# Patient Record
Sex: FEMALE | Race: WHITE
[De-identification: names, ages, dates, MRNs, and addresses within clinical notes are randomized per-mention and may not be internally consistent; named-entity substitution may affect disease eponyms.]

---

## 2021-04-02 ENCOUNTER — HOSPITAL ENCOUNTER (INPATIENT)
Dept: HOSPITAL 74 - JER | Age: 86
LOS: 4 days | Discharge: NURSING, REHAB OR LONG-TERM CARE | DRG: 57 | End: 2021-04-06
Attending: INTERNAL MEDICINE | Admitting: HOSPITALIST
Payer: COMMERCIAL

## 2021-04-02 VITALS — BODY MASS INDEX: 30.6 KG/M2

## 2021-04-02 DIAGNOSIS — E78.5: ICD-10-CM

## 2021-04-02 DIAGNOSIS — I10: ICD-10-CM

## 2021-04-02 DIAGNOSIS — G20: Primary | ICD-10-CM

## 2021-04-02 DIAGNOSIS — E11.42: ICD-10-CM

## 2021-04-02 DIAGNOSIS — N39.0: ICD-10-CM

## 2021-04-02 DIAGNOSIS — E86.0: ICD-10-CM

## 2021-04-02 DIAGNOSIS — F17.210: ICD-10-CM

## 2021-04-02 DIAGNOSIS — E03.9: ICD-10-CM

## 2021-04-02 DIAGNOSIS — R29.6: ICD-10-CM

## 2021-04-02 LAB
ALBUMIN SERPL-MCNC: 3.8 G/DL (ref 3.4–5)
ALP SERPL-CCNC: 110 U/L (ref 45–117)
ALT SERPL-CCNC: 9 U/L (ref 13–61)
ANION GAP SERPL CALC-SCNC: 6 MMOL/L (ref 8–16)
ANION GAP SERPL CALC-SCNC: 6 MMOL/L (ref 8–16)
APPEARANCE UR: (no result)
APTT BLD: 26.3 SECONDS (ref 25.2–36.5)
AST SERPL-CCNC: 23 U/L (ref 15–37)
BACTERIA # UR AUTO: 8181 /UL (ref 0–1359)
BASOPHILS # BLD: 1.4 % (ref 0–2)
BILIRUB SERPL-MCNC: 0.5 MG/DL (ref 0.2–1)
BILIRUB UR STRIP.AUTO-MCNC: NEGATIVE MG/DL
BUN SERPL-MCNC: 29.5 MG/DL (ref 7–18)
BUN SERPL-MCNC: 30 MG/DL (ref 7–18)
CALCIUM SERPL-MCNC: 9.2 MG/DL (ref 8.5–10.1)
CALCIUM SERPL-MCNC: 9.6 MG/DL (ref 8.5–10.1)
CASTS URNS QL MICRO: 3 /UL (ref 0–3.1)
CHLORIDE SERPL-SCNC: 108 MMOL/L (ref 98–107)
CHLORIDE SERPL-SCNC: 108 MMOL/L (ref 98–107)
CO2 SERPL-SCNC: 26 MMOL/L (ref 21–32)
CO2 SERPL-SCNC: 27 MMOL/L (ref 21–32)
COLOR UR: YELLOW
CREAT SERPL-MCNC: 1.1 MG/DL (ref 0.55–1.3)
CREAT SERPL-MCNC: 1.2 MG/DL (ref 0.55–1.3)
CRYSTALS URNS QL MICRO: NEGATIVE /HPF
DEPRECATED RDW RBC AUTO: 13.8 % (ref 11.6–15.6)
EOSINOPHIL # BLD: 2.9 % (ref 0–4.5)
EPITH CASTS URNS QL MICRO: 13 /UL (ref 0–25.1)
GLUCOSE SERPL-MCNC: 114 MG/DL (ref 74–106)
GLUCOSE SERPL-MCNC: 129 MG/DL (ref 74–106)
HCT VFR BLD CALC: 40 % (ref 32.4–45.2)
HGB BLD-MCNC: 12.9 GM/DL (ref 10.7–15.3)
INR BLD: 0.92 (ref 0.83–1.09)
KETONES UR QL STRIP: (no result)
LEUKOCYTE ESTERASE UR QL STRIP.AUTO: (no result)
LYMPHOCYTES # BLD: 21.6 % (ref 8–40)
MCH RBC QN AUTO: 30.6 PG (ref 25.7–33.7)
MCHC RBC AUTO-ENTMCNC: 32.2 G/DL (ref 32–36)
MCV RBC: 95 FL (ref 80–96)
MONOCYTES # BLD AUTO: 9 % (ref 3.8–10.2)
NEUTROPHILS # BLD: 65.1 % (ref 42.8–82.8)
NITRITE UR QL STRIP: POSITIVE
PH UR: 5 [PH] (ref 5–8)
PLATELET # BLD AUTO: 192 K/MM3 (ref 134–434)
PMV BLD: 10.6 FL (ref 7.5–11.1)
POTASSIUM SERPLBLD-SCNC: 4.3 MMOL/L (ref 3.5–5.1)
POTASSIUM SERPLBLD-SCNC: 5.5 MMOL/L (ref 3.5–5.1)
PROT SERPL-MCNC: 6.7 G/DL (ref 6.4–8.2)
PROT UR QL STRIP: (no result)
PROT UR QL STRIP: NEGATIVE
PT PNL PPP: 11.3 SEC (ref 9.7–13)
RBC # BLD AUTO: 34 /UL (ref 0–23.9)
RBC # BLD AUTO: 4.21 M/MM3 (ref 3.6–5.2)
SODIUM SERPL-SCNC: 140 MMOL/L (ref 136–145)
SODIUM SERPL-SCNC: 141 MMOL/L (ref 136–145)
SP GR UR: 1.02 (ref 1.01–1.03)
UROBILINOGEN UR STRIP-MCNC: 0.2 MG/DL (ref 0.2–1)
WBC # BLD AUTO: 8.7 K/MM3 (ref 4–10)
WBC # UR AUTO: 265 /UL (ref 0–25.8)

## 2021-04-02 PROCEDURE — C9803 HOPD COVID-19 SPEC COLLECT: HCPCS

## 2021-04-02 PROCEDURE — U0005 INFEC AGEN DETEC AMPLI PROBE: HCPCS

## 2021-04-02 PROCEDURE — U0003 INFECTIOUS AGENT DETECTION BY NUCLEIC ACID (DNA OR RNA); SEVERE ACUTE RESPIRATORY SYNDROME CORONAVIRUS 2 (SARS-COV-2) (CORONAVIRUS DISEASE [COVID-19]), AMPLIFIED PROBE TECHNIQUE, MAKING USE OF HIGH THROUGHPUT TECHNOLOGIES AS DESCRIBED BY CMS-2020-01-R: HCPCS

## 2021-04-03 LAB
ANION GAP SERPL CALC-SCNC: 4 MMOL/L (ref 8–16)
BASOPHILS # BLD: 1.1 % (ref 0–2)
BUN SERPL-MCNC: 25.6 MG/DL (ref 7–18)
CALCIUM SERPL-MCNC: 9 MG/DL (ref 8.5–10.1)
CHLORIDE SERPL-SCNC: 106 MMOL/L (ref 98–107)
CO2 SERPL-SCNC: 30 MMOL/L (ref 21–32)
CREAT SERPL-MCNC: 1 MG/DL (ref 0.55–1.3)
DEPRECATED RDW RBC AUTO: 13.8 % (ref 11.6–15.6)
EOSINOPHIL # BLD: 3.4 % (ref 0–4.5)
GLUCOSE SERPL-MCNC: 135 MG/DL (ref 74–106)
HCT VFR BLD CALC: 38.5 % (ref 32.4–45.2)
HGB BLD-MCNC: 12.9 GM/DL (ref 10.7–15.3)
LYMPHOCYTES # BLD: 18.5 % (ref 8–40)
MAGNESIUM SERPL-MCNC: 1.9 MG/DL (ref 1.8–2.4)
MCH RBC QN AUTO: 31.4 PG (ref 25.7–33.7)
MCHC RBC AUTO-ENTMCNC: 33.4 G/DL (ref 32–36)
MCV RBC: 93.9 FL (ref 80–96)
MONOCYTES # BLD AUTO: 8.4 % (ref 3.8–10.2)
NEUTROPHILS # BLD: 68.6 % (ref 42.8–82.8)
PHOSPHATE SERPL-MCNC: 3.6 MG/DL (ref 2.5–4.9)
PLATELET # BLD AUTO: 199 K/MM3 (ref 134–434)
PMV BLD: 10.2 FL (ref 7.5–11.1)
POTASSIUM SERPLBLD-SCNC: 4.3 MMOL/L (ref 3.5–5.1)
RBC # BLD AUTO: 4.1 M/MM3 (ref 3.6–5.2)
SODIUM SERPL-SCNC: 141 MMOL/L (ref 136–145)
WBC # BLD AUTO: 8.7 K/MM3 (ref 4–10)

## 2021-04-03 RX ADMIN — INSULIN ASPART SCH: 100 INJECTION, SOLUTION INTRAVENOUS; SUBCUTANEOUS at 17:42

## 2021-04-03 RX ADMIN — CEFTRIAXONE SCH MLS/HR: 1 INJECTION, POWDER, FOR SOLUTION INTRAMUSCULAR; INTRAVENOUS at 18:56

## 2021-04-03 RX ADMIN — INSULIN ASPART SCH: 100 INJECTION, SOLUTION INTRAVENOUS; SUBCUTANEOUS at 21:37

## 2021-04-03 RX ADMIN — INSULIN ASPART SCH: 100 INJECTION, SOLUTION INTRAVENOUS; SUBCUTANEOUS at 06:04

## 2021-04-03 RX ADMIN — ENOXAPARIN SODIUM SCH MG: 40 INJECTION SUBCUTANEOUS at 10:39

## 2021-04-03 RX ADMIN — INSULIN ASPART SCH UNITS: 100 INJECTION, SOLUTION INTRAVENOUS; SUBCUTANEOUS at 11:13

## 2021-04-03 RX ADMIN — SODIUM CHLORIDE SCH MLS/HR: 4.5 INJECTION, SOLUTION INTRAVENOUS at 10:38

## 2021-04-04 LAB
ANION GAP SERPL CALC-SCNC: 5 MMOL/L (ref 8–16)
BASOPHILS # BLD: 1.4 % (ref 0–2)
BUN SERPL-MCNC: 22.8 MG/DL (ref 7–18)
CALCIUM SERPL-MCNC: 8.6 MG/DL (ref 8.5–10.1)
CHLORIDE SERPL-SCNC: 104 MMOL/L (ref 98–107)
CO2 SERPL-SCNC: 29 MMOL/L (ref 21–32)
CREAT SERPL-MCNC: 1 MG/DL (ref 0.55–1.3)
DEPRECATED RDW RBC AUTO: 13.8 % (ref 11.6–15.6)
EOSINOPHIL # BLD: 3.7 % (ref 0–4.5)
GLUCOSE SERPL-MCNC: 194 MG/DL (ref 74–106)
HCT VFR BLD CALC: 36.9 % (ref 32.4–45.2)
HGB BLD-MCNC: 12.3 GM/DL (ref 10.7–15.3)
LYMPHOCYTES # BLD: 17.8 % (ref 8–40)
MAGNESIUM SERPL-MCNC: 1.7 MG/DL (ref 1.8–2.4)
MCH RBC QN AUTO: 31.2 PG (ref 25.7–33.7)
MCHC RBC AUTO-ENTMCNC: 33.3 G/DL (ref 32–36)
MCV RBC: 93.7 FL (ref 80–96)
MONOCYTES # BLD AUTO: 8.1 % (ref 3.8–10.2)
NEUTROPHILS # BLD: 69 % (ref 42.8–82.8)
PHOSPHATE SERPL-MCNC: 4.1 MG/DL (ref 2.5–4.9)
PLATELET # BLD AUTO: 201 K/MM3 (ref 134–434)
PMV BLD: 10.1 FL (ref 7.5–11.1)
POTASSIUM SERPLBLD-SCNC: 4.4 MMOL/L (ref 3.5–5.1)
RBC # BLD AUTO: 3.94 M/MM3 (ref 3.6–5.2)
SODIUM SERPL-SCNC: 138 MMOL/L (ref 136–145)
WBC # BLD AUTO: 8.4 K/MM3 (ref 4–10)

## 2021-04-04 RX ADMIN — INSULIN ASPART SCH: 100 INJECTION, SOLUTION INTRAVENOUS; SUBCUTANEOUS at 16:36

## 2021-04-04 RX ADMIN — ENOXAPARIN SODIUM SCH MG: 40 INJECTION SUBCUTANEOUS at 09:11

## 2021-04-04 RX ADMIN — SODIUM CHLORIDE SCH MLS/HR: 4.5 INJECTION, SOLUTION INTRAVENOUS at 17:41

## 2021-04-04 RX ADMIN — INSULIN ASPART SCH: 100 INJECTION, SOLUTION INTRAVENOUS; SUBCUTANEOUS at 22:11

## 2021-04-04 RX ADMIN — INSULIN ASPART SCH: 100 INJECTION, SOLUTION INTRAVENOUS; SUBCUTANEOUS at 06:11

## 2021-04-04 RX ADMIN — CEFTRIAXONE SCH MLS/HR: 1 INJECTION, POWDER, FOR SOLUTION INTRAMUSCULAR; INTRAVENOUS at 09:11

## 2021-04-04 RX ADMIN — INSULIN ASPART SCH UNITS: 100 INJECTION, SOLUTION INTRAVENOUS; SUBCUTANEOUS at 12:57

## 2021-04-05 RX ADMIN — CARBIDOPA AND LEVODOPA SCH EACH: 25; 250 TABLET ORAL at 22:02

## 2021-04-05 RX ADMIN — INSULIN ASPART SCH: 100 INJECTION, SOLUTION INTRAVENOUS; SUBCUTANEOUS at 06:20

## 2021-04-05 RX ADMIN — INSULIN ASPART SCH UNITS: 100 INJECTION, SOLUTION INTRAVENOUS; SUBCUTANEOUS at 17:09

## 2021-04-05 RX ADMIN — ENOXAPARIN SODIUM SCH MG: 40 INJECTION SUBCUTANEOUS at 09:49

## 2021-04-05 RX ADMIN — INSULIN ASPART SCH UNITS: 100 INJECTION, SOLUTION INTRAVENOUS; SUBCUTANEOUS at 11:27

## 2021-04-05 RX ADMIN — INSULIN ASPART SCH: 100 INJECTION, SOLUTION INTRAVENOUS; SUBCUTANEOUS at 22:07

## 2021-04-05 RX ADMIN — CEFTRIAXONE SCH MLS/HR: 1 INJECTION, POWDER, FOR SOLUTION INTRAMUSCULAR; INTRAVENOUS at 09:49

## 2021-04-06 VITALS — TEMPERATURE: 98.6 F | SYSTOLIC BLOOD PRESSURE: 145 MMHG | HEART RATE: 65 BPM | DIASTOLIC BLOOD PRESSURE: 74 MMHG

## 2021-04-06 RX ADMIN — INSULIN ASPART SCH: 100 INJECTION, SOLUTION INTRAVENOUS; SUBCUTANEOUS at 18:06

## 2021-04-06 RX ADMIN — CARBIDOPA AND LEVODOPA SCH EACH: 25; 250 TABLET ORAL at 09:54

## 2021-04-06 RX ADMIN — CARBIDOPA AND LEVODOPA SCH EACH: 25; 250 TABLET ORAL at 15:08

## 2021-04-06 RX ADMIN — SODIUM CHLORIDE SCH: 4.5 INJECTION, SOLUTION INTRAVENOUS at 09:54

## 2021-04-06 RX ADMIN — ENOXAPARIN SODIUM SCH MG: 40 INJECTION SUBCUTANEOUS at 09:54

## 2021-04-06 RX ADMIN — CEFTRIAXONE SCH MLS/HR: 1 INJECTION, POWDER, FOR SOLUTION INTRAMUSCULAR; INTRAVENOUS at 09:54

## 2021-04-06 RX ADMIN — INSULIN ASPART SCH: 100 INJECTION, SOLUTION INTRAVENOUS; SUBCUTANEOUS at 06:26

## 2021-04-06 RX ADMIN — INSULIN ASPART SCH: 100 INJECTION, SOLUTION INTRAVENOUS; SUBCUTANEOUS at 11:22

## 2021-04-06 RX ADMIN — CARBIDOPA AND LEVODOPA SCH EACH: 25; 250 TABLET ORAL at 18:07

## 2021-06-10 PROBLEM — Z00.00 ENCOUNTER FOR PREVENTIVE HEALTH EXAMINATION: Status: ACTIVE | Noted: 2021-06-10

## 2021-06-11 ENCOUNTER — APPOINTMENT (OUTPATIENT)
Dept: PULMONOLOGY | Facility: CLINIC | Age: 86
End: 2021-06-11
Payer: MEDICARE

## 2021-06-11 VITALS
HEART RATE: 89 BPM | BODY MASS INDEX: 29.88 KG/M2 | SYSTOLIC BLOOD PRESSURE: 140 MMHG | WEIGHT: 175 LBS | TEMPERATURE: 96.6 F | OXYGEN SATURATION: 90 % | HEIGHT: 64 IN | DIASTOLIC BLOOD PRESSURE: 70 MMHG

## 2021-06-11 DIAGNOSIS — E06.9 THYROIDITIS, UNSPECIFIED: ICD-10-CM

## 2021-06-11 DIAGNOSIS — E78.5 HYPERLIPIDEMIA, UNSPECIFIED: ICD-10-CM

## 2021-06-11 DIAGNOSIS — Z87.891 PERSONAL HISTORY OF NICOTINE DEPENDENCE: ICD-10-CM

## 2021-06-11 PROCEDURE — 99203 OFFICE O/P NEW LOW 30 MIN: CPT

## 2021-06-11 NOTE — HISTORY OF PRESENT ILLNESS
[FreeTextEntry1] : Evaluation for home O2 appear [de-identified] : Patient is an 85-year-old female heavy smoker of 1-1/2 pack per day. She quit smoking one to 2 months ago. At that time she was hospitalized at [facility] after falls at home. She has a history of Parkinson's disease. The falls were felt secondary to Parkinson's. After hospitalization she went to [facility]. At the nursing home she was given home oxygen. She now is home for the past one week. She has an oxygen concentrator at home but is requesting a portable oxygen. Past medical history Parkinson's disease, diabetes, hyperlipidemia, depression. Medications were reviewed. She walks short distances with a walker. Current O2 sat 88 on room air at rest. She is not on bronchodilators currently.

## 2021-06-11 NOTE — PLAN
[FreeTextEntry1] : Will arrange for portable oxygen concentrator. Patient is to followup after PFTs performed.

## 2021-06-11 NOTE — ASSESSMENT
[FreeTextEntry1] : Patient with likely severe COPD and chronic hypoxemia. Current O2 sat 88 on room air at rest. I will start patient on anoro. Patient will be referred for a complete pulmonary function tests. Patient also qualifies for home oxygen and a portable oxygen concentrator. We'll arrange for portable oxygen at 2 L per minute using a conserving device.

## 2021-06-11 NOTE — PHYSICAL EXAM
[No Acute Distress] : no acute distress [Well Nourished] : well nourished [Well Developed] : well developed [Well-Appearing] : well-appearing [Normal Sclera/Conjunctiva] : normal sclera/conjunctiva [PERRL] : pupils equal round and reactive to light [EOMI] : extraocular movements intact [Normal Outer Ear/Nose] : the outer ears and nose were normal in appearance [Normal Oropharynx] : the oropharynx was normal [No JVD] : no jugular venous distention [No Lymphadenopathy] : no lymphadenopathy [Supple] : supple [Thyroid Normal, No Nodules] : the thyroid was normal and there were no nodules present [No Respiratory Distress] : no respiratory distress  [No Accessory Muscle Use] : no accessory muscle use [Normal Rate] : normal rate  [Regular Rhythm] : with a regular rhythm [Normal S1, S2] : normal S1 and S2 [No Murmur] : no murmur heard [No Carotid Bruits] : no carotid bruits [No Abdominal Bruit] : a ~M bruit was not heard ~T in the abdomen [No Varicosities] : no varicosities [Pedal Pulses Present] : the pedal pulses are present [No Edema] : there was no peripheral edema [No Palpable Aorta] : no palpable aorta [No Extremity Clubbing/Cyanosis] : no extremity clubbing/cyanosis [Soft] : abdomen soft [Non Tender] : non-tender [Non-distended] : non-distended [No Masses] : no abdominal mass palpated [No HSM] : no HSM [Normal Bowel Sounds] : normal bowel sounds [Normal Posterior Cervical Nodes] : no posterior cervical lymphadenopathy [Normal Anterior Cervical Nodes] : no anterior cervical lymphadenopathy [No CVA Tenderness] : no CVA  tenderness [No Spinal Tenderness] : no spinal tenderness [No Joint Swelling] : no joint swelling [Grossly Normal Strength/Tone] : grossly normal strength/tone [No Rash] : no rash [Coordination Grossly Intact] : coordination grossly intact [No Focal Deficits] : no focal deficits [Normal Gait] : normal gait [Normal Affect] : the affect was normal [Normal Insight/Judgement] : insight and judgment were intact [de-identified] : decreased bs's

## 2021-07-21 ENCOUNTER — APPOINTMENT (OUTPATIENT)
Dept: PULMONOLOGY | Facility: CLINIC | Age: 86
End: 2021-07-21
Payer: MEDICARE

## 2021-07-21 VITALS
TEMPERATURE: 96.7 F | HEART RATE: 108 BPM | HEIGHT: 64 IN | DIASTOLIC BLOOD PRESSURE: 70 MMHG | WEIGHT: 175 LBS | OXYGEN SATURATION: 92 % | BODY MASS INDEX: 29.88 KG/M2 | SYSTOLIC BLOOD PRESSURE: 120 MMHG

## 2021-07-21 PROCEDURE — 99213 OFFICE O/P EST LOW 20 MIN: CPT

## 2021-07-21 NOTE — HISTORY OF PRESENT ILLNESS
[FreeTextEntry1] : Followup pulmonary function test. [de-identified] : Patient with known severe COPD. She started taking an oral and she states her breathing is fine at rest. She gets short of breath walking 20-30 feet. She uses oxygen on a p.r.n. basis. PFTs were reviewed FEV1 0.91 L or 49% of predicted with a diffusion capacity of 51% of predicted. Current O2 sat 90 on room air.

## 2021-07-21 NOTE — ASSESSMENT
[FreeTextEntry1] : Patient advised to use oxygen when she walks and with sleep. She may go off oxygen when at rest. Patient is advised to continue an oral and return to see me in 6 months.

## 2021-07-21 NOTE — PHYSICAL EXAM
[No Acute Distress] : no acute distress [Well Nourished] : well nourished [Well Developed] : well developed [Well-Appearing] : well-appearing [Normal Sclera/Conjunctiva] : normal sclera/conjunctiva [PERRL] : pupils equal round and reactive to light [EOMI] : extraocular movements intact [Normal Outer Ear/Nose] : the outer ears and nose were normal in appearance [Normal Oropharynx] : the oropharynx was normal [No JVD] : no jugular venous distention [No Lymphadenopathy] : no lymphadenopathy [Supple] : supple [Thyroid Normal, No Nodules] : the thyroid was normal and there were no nodules present [No Respiratory Distress] : no respiratory distress  [No Accessory Muscle Use] : no accessory muscle use [Normal Rate] : normal rate  [Regular Rhythm] : with a regular rhythm [Normal S1, S2] : normal S1 and S2 [No Murmur] : no murmur heard [No Carotid Bruits] : no carotid bruits [No Abdominal Bruit] : a ~M bruit was not heard ~T in the abdomen [No Varicosities] : no varicosities [Pedal Pulses Present] : the pedal pulses are present [No Edema] : there was no peripheral edema [No Palpable Aorta] : no palpable aorta [No Extremity Clubbing/Cyanosis] : no extremity clubbing/cyanosis [Soft] : abdomen soft [Non Tender] : non-tender [Non-distended] : non-distended [No Masses] : no abdominal mass palpated [No HSM] : no HSM [Normal Bowel Sounds] : normal bowel sounds [Normal Posterior Cervical Nodes] : no posterior cervical lymphadenopathy [Normal Anterior Cervical Nodes] : no anterior cervical lymphadenopathy [No CVA Tenderness] : no CVA  tenderness [No Spinal Tenderness] : no spinal tenderness [No Joint Swelling] : no joint swelling [Grossly Normal Strength/Tone] : grossly normal strength/tone [No Rash] : no rash [Coordination Grossly Intact] : coordination grossly intact [No Focal Deficits] : no focal deficits [Normal Gait] : normal gait [Deep Tendon Reflexes (DTR)] : deep tendon reflexes were 2+ and symmetric [Normal Affect] : the affect was normal [Normal Insight/Judgement] : insight and judgment were intact [de-identified] : decreased bs's

## 2022-01-19 ENCOUNTER — APPOINTMENT (OUTPATIENT)
Dept: PULMONOLOGY | Facility: CLINIC | Age: 87
End: 2022-01-19
Payer: MEDICARE

## 2022-01-19 VITALS
DIASTOLIC BLOOD PRESSURE: 80 MMHG | HEIGHT: 64 IN | WEIGHT: 190 LBS | BODY MASS INDEX: 32.44 KG/M2 | SYSTOLIC BLOOD PRESSURE: 120 MMHG | TEMPERATURE: 97.1 F | HEART RATE: 84 BPM | OXYGEN SATURATION: 95 %

## 2022-01-19 PROCEDURE — 99214 OFFICE O/P EST MOD 30 MIN: CPT

## 2022-01-19 NOTE — ASSESSMENT
[FreeTextEntry1] : Severe COPD with an FEV1 of 49% dictated. Patient is doing well clinically. There've been no exacerbations. Current O2 sat 92% on room air at rest. Patient is advised to use oxygen when she sleeps and with activity. Patient is mainly homebound. She will continue anoro and return to see me in 6 months.

## 2022-01-19 NOTE — PHYSICAL EXAM
[No Acute Distress] : no acute distress [Well Nourished] : well nourished [Well Developed] : well developed [Well-Appearing] : well-appearing [Normal Sclera/Conjunctiva] : normal sclera/conjunctiva [PERRL] : pupils equal round and reactive to light [EOMI] : extraocular movements intact [Normal Outer Ear/Nose] : the outer ears and nose were normal in appearance [Normal Oropharynx] : the oropharynx was normal [No JVD] : no jugular venous distention [No Lymphadenopathy] : no lymphadenopathy [Supple] : supple [Thyroid Normal, No Nodules] : the thyroid was normal and there were no nodules present [No Respiratory Distress] : no respiratory distress  [No Accessory Muscle Use] : no accessory muscle use [Normal Rate] : normal rate  [Regular Rhythm] : with a regular rhythm [Normal S1, S2] : normal S1 and S2 [No Murmur] : no murmur heard [No Carotid Bruits] : no carotid bruits [No Abdominal Bruit] : a ~M bruit was not heard ~T in the abdomen [No Varicosities] : no varicosities [Pedal Pulses Present] : the pedal pulses are present [No Edema] : there was no peripheral edema [No Palpable Aorta] : no palpable aorta [No Extremity Clubbing/Cyanosis] : no extremity clubbing/cyanosis [Soft] : abdomen soft [Non Tender] : non-tender [Non-distended] : non-distended [No Masses] : no abdominal mass palpated [No HSM] : no HSM [Normal Bowel Sounds] : normal bowel sounds [Normal Posterior Cervical Nodes] : no posterior cervical lymphadenopathy [Normal Anterior Cervical Nodes] : no anterior cervical lymphadenopathy [No CVA Tenderness] : no CVA  tenderness [No Spinal Tenderness] : no spinal tenderness [No Joint Swelling] : no joint swelling [Grossly Normal Strength/Tone] : grossly normal strength/tone [No Rash] : no rash [Coordination Grossly Intact] : coordination grossly intact [No Focal Deficits] : no focal deficits [Normal Gait] : normal gait [Deep Tendon Reflexes (DTR)] : deep tendon reflexes were 2+ and symmetric [Normal Affect] : the affect was normal [Normal Insight/Judgement] : insight and judgment were intact [de-identified] : decreased bs's

## 2022-01-19 NOTE — HISTORY OF PRESENT ILLNESS
[FreeTextEntry1] : Followup COPD [de-identified] : Patient has been relatively stable over the past 6 months. She is not smoking. She is essentially homebound. She will short distances with a walker. She uses oxygen on a p.r.n. basis about 3 hours per day. She does not sleep with oxygen. She does not feel she needs it. She denies shortness of breath, cough or wheezing. She feels her breathing is fine. She is maintained on anoro. There have been no exacerbations over the past 6 months. She recently was started on Aricept by neurology. There is a history of Parkinson's disease. Again her respiratory status is very stable over the past 6 months.

## 2022-02-23 ENCOUNTER — APPOINTMENT (OUTPATIENT)
Dept: ENDOCRINOLOGY | Facility: CLINIC | Age: 87
End: 2022-02-23
Payer: MEDICARE

## 2022-02-23 ENCOUNTER — HOSPITAL ENCOUNTER (EMERGENCY)
Dept: HOSPITAL 74 - JER | Age: 87
Discharge: ANOTHER HOSPITAL | End: 2022-02-23
Payer: COMMERCIAL

## 2022-02-23 VITALS — DIASTOLIC BLOOD PRESSURE: 68 MMHG | TEMPERATURE: 98.7 F | SYSTOLIC BLOOD PRESSURE: 162 MMHG | HEART RATE: 74 BPM

## 2022-02-23 VITALS
BODY MASS INDEX: 32.78 KG/M2 | SYSTOLIC BLOOD PRESSURE: 120 MMHG | OXYGEN SATURATION: 97 % | WEIGHT: 192 LBS | DIASTOLIC BLOOD PRESSURE: 68 MMHG | HEART RATE: 79 BPM | HEIGHT: 64 IN

## 2022-02-23 VITALS — BODY MASS INDEX: 32.5 KG/M2
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
## 2022-02-23 DIAGNOSIS — S00.03XA: Primary | ICD-10-CM

## 2022-02-23 DIAGNOSIS — S06.360A: ICD-10-CM

## 2022-02-23 LAB
ALBUMIN SERPL-MCNC: 4 G/DL (ref 3.4–5)
ALP SERPL-CCNC: 128 U/L (ref 45–117)
ALT SERPL-CCNC: 17 U/L (ref 13–61)
ANION GAP SERPL CALC-SCNC: 8 MMOL/L (ref 8–16)
APTT BLD: 28.9 SECONDS (ref 25.2–36.5)
AST SERPL-CCNC: 36 U/L (ref 15–37)
BASOPHILS # BLD: 0.7 % (ref 0–2)
BILIRUB SERPL-MCNC: 0.5 MG/DL (ref 0.2–1)
BUN SERPL-MCNC: 23.4 MG/DL (ref 7–18)
CALCIUM SERPL-MCNC: 9.3 MG/DL (ref 8.5–10.1)
CHLORIDE SERPL-SCNC: 103 MMOL/L (ref 98–107)
CO2 SERPL-SCNC: 28 MMOL/L (ref 21–32)
CREAT SERPL-MCNC: 1.2 MG/DL (ref 0.55–1.3)
DEPRECATED RDW RBC AUTO: 14.9 % (ref 11.6–15.6)
EOSINOPHIL # BLD: 0.6 % (ref 0–4.5)
GLUCOSE SERPL-MCNC: 133 MG/DL (ref 74–106)
HCT VFR BLD CALC: 40.5 % (ref 32.4–45.2)
HGB BLD-MCNC: 13.2 GM/DL (ref 10.7–15.3)
INR BLD: 0.95 (ref 0.83–1.09)
LYMPHOCYTES # BLD: 16.7 % (ref 8–40)
MAGNESIUM SERPL-MCNC: 1.9 MG/DL (ref 1.8–2.4)
MCH RBC QN AUTO: 29.7 PG (ref 25.7–33.7)
MCHC RBC AUTO-ENTMCNC: 32.7 G/DL (ref 32–36)
MCV RBC: 90.9 FL (ref 80–96)
MONOCYTES # BLD AUTO: 6.9 % (ref 3.8–10.2)
NEUTROPHILS # BLD: 75.1 % (ref 42.8–82.8)
PHOSPHATE SERPL-MCNC: 3.2 MG/DL (ref 2.5–4.9)
PLATELET # BLD AUTO: 214 10^3/UL (ref 134–434)
PMV BLD: 8.6 FL (ref 7.5–11.1)
PROT SERPL-MCNC: 7.5 G/DL (ref 6.4–8.2)
PT PNL PPP: 10.9 SEC (ref 9.7–13)
RBC # BLD AUTO: 4.46 M/MM3 (ref 3.6–5.2)
SODIUM SERPL-SCNC: 140 MMOL/L (ref 136–145)
WBC # BLD AUTO: 11.7 K/MM3 (ref 4–10)

## 2022-02-23 PROCEDURE — 3E0234Z INTRODUCTION OF SERUM, TOXOID AND VACCINE INTO MUSCLE, PERCUTANEOUS APPROACH: ICD-10-PCS

## 2022-02-23 PROCEDURE — U0005 INFEC AGEN DETEC AMPLI PROBE: HCPCS

## 2022-02-23 PROCEDURE — C9803 HOPD COVID-19 SPEC COLLECT: HCPCS

## 2022-02-23 PROCEDURE — 99203 OFFICE O/P NEW LOW 30 MIN: CPT

## 2022-02-23 PROCEDURE — U0003 INFECTIOUS AGENT DETECTION BY NUCLEIC ACID (DNA OR RNA); SEVERE ACUTE RESPIRATORY SYNDROME CORONAVIRUS 2 (SARS-COV-2) (CORONAVIRUS DISEASE [COVID-19]), AMPLIFIED PROBE TECHNIQUE, MAKING USE OF HIGH THROUGHPUT TECHNOLOGIES AS DESCRIBED BY CMS-2020-01-R: HCPCS

## 2022-02-23 NOTE — HISTORY OF PRESENT ILLNESS
[FreeTextEntry1] : Feb 23, 2022        in wheelchair accompanied by friend and caregiver   Sarah Biswas (rel to [facility])    [phone] \par                                   as well as her home health aide\par \par \par PCP:  Dr. Cy Ruby\par         Pulm:  Dr. Vasu Fernandez \par         Neurology:  Dr. Ciro Jimenez\par \par CC:   Diabetes    (11/9/21 [place] A1c 8.4 % ,  ,gd;.  TSH 0.4), Vit D 27.7)\par          (Hypothyroid)\par          (Partkinson's disease)\par \par 87 yo accompanied by her friend and caregiver, Sarah Biswas.   Patient has Parkinson's Disease for about 25 years and requires a wheelchair.\par \par She brings in a list of her medications which includes\par :\par  Metformin 500 mg BID\par Levothyroxine 75 mcg daily\par atrovastain 10 mg daily\par lisinopril 30 mg daily\par amlodipine 5 mg daily\par lexapro 10 mg daily\par albuterol\par anoro ellipta\par donepezil 5 mg in PM   \par \par A1c  8.4 \par \par Impression:  Blood sugar in mid 200's after breakfast.\par \par Plan:  Reviewed ADA gudielines and strategies to achieve these.\par Asked her aide to assist in  monitoring sugars for 3 meals a week.\par ROV in 2 and 4 months.\par \par Thank you.  -duong

## 2022-04-18 ENCOUNTER — APPOINTMENT (OUTPATIENT)
Dept: ENDOCRINOLOGY | Facility: CLINIC | Age: 87
End: 2022-04-18
Payer: MEDICARE

## 2022-04-18 VITALS
HEIGHT: 64 IN | OXYGEN SATURATION: 95 % | DIASTOLIC BLOOD PRESSURE: 70 MMHG | HEART RATE: 84 BPM | SYSTOLIC BLOOD PRESSURE: 125 MMHG | WEIGHT: 190 LBS | BODY MASS INDEX: 32.44 KG/M2

## 2022-04-18 PROCEDURE — 99214 OFFICE O/P EST MOD 30 MIN: CPT | Mod: 25

## 2022-04-18 PROCEDURE — 36415 COLL VENOUS BLD VENIPUNCTURE: CPT

## 2022-04-18 NOTE — HISTORY OF PRESENT ILLNESS
[FreeTextEntry1] : Apr 18, 2022      in person, accompanied by her friend and caregiver\par \par PCP:  Dr. Cy Ruby\par         Pulm:  Dr. Vasu Fernandez \par         Neurology:  Dr. Ciro Jimenez\par \par CC:   Diabetes    (11/9/21 [place] A1c 8.4 % ,  ,gd;.  TSH 0.4), Vit D 27.7)\par          (Hypothyroid)\par          (Partkinson's disease)\par \par 87 yo accompanied by her friend and caregiver, Sarah Biswas.   Patient has Parkinson's Disease for about 25 years and requires a wheelchair.\par \par Since last visit, obtained a OneTouch Verio meter, but was having difficulty using it.\par \par Verio shows BS today 222 m/gdl. \par \par : :\par Constitutional:  Alert, well nourished, healthy appearance, no acute distress \par Eyes:  No proptosis, no stare\par Thyroid:\par Pulmonary:  No respiratory distress, no accessory muscle use; normal rate and effort\par Cardiac:  Normal rate\par Vascular: \par Endocrine:  No stigmata of Cushing’s Syndrome\par Musculoskeletal:  Normal gait, no involuntary movements\par Neurology:  No tremors\par Affect/Mood/Psych:  Oriented x 3; normal affect, normal insight/judgement, normal mood \par .\par \par Impression: Diabetes under loose control.\par \par Plan:  She will start testing three meals a week.\par Bring results to next viisit in August.\par A1c today.  \par \par \par \par \par Feb 23, 2022        in wheelchair accompanied by friend and caregiver   Sarah Biswas (rel to [facility])    [phone] \par                                   as well as her home health aide\par \par \par PCP:  Dr. Cy Ruby\par         Pulm:  Dr. Vasu Fernandez \par         Neurology:  Dr. Cior Jimenez\par \par CC:   Diabetes    (11/9/21 [place] A1c 8.4 % ,  ,gd;.  TSH 0.4), Vit D 27.7)\par          (Hypothyroid)\par          (Partkinson's disease)\par \par 87 yo accompanied by her friend and caregiver, Sarah Biswas.   Patient has Parkinson's Disease for about 25 years and requires a wheelchair.\par \par She brings in a list of her medications which includes\par :\par  Metformin 500 mg BID\par Levothyroxine 75 mcg daily\par atrovastain 10 mg daily\par lisinopril 30 mg daily\par amlodipine 5 mg daily\par lexapro 10 mg daily\par albuterol\par anoro ellipta\par donepezil 5 mg in PM   \par \par A1c  8.4 \par \par Impression:  Blood sugar in mid 200's after breakfast.\par \par Plan:  Reviewed ADA gudielines and strategies to achieve these.\par Asked her aide to assist in  monitoring sugars for 3 meals a week.\par ROV in 2 and 4 months.\par \par Thank you.  -duong

## 2022-05-29 ENCOUNTER — HOSPITAL ENCOUNTER (EMERGENCY)
Dept: HOSPITAL 74 - JER | Age: 87
Discharge: HOME | End: 2022-05-29
Payer: COMMERCIAL

## 2022-05-29 VITALS — TEMPERATURE: 97.2 F

## 2022-05-29 VITALS — SYSTOLIC BLOOD PRESSURE: 119 MMHG | DIASTOLIC BLOOD PRESSURE: 78 MMHG | HEART RATE: 99 BPM

## 2022-05-29 VITALS — BODY MASS INDEX: 32.1 KG/M2
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
## 2022-05-29 DIAGNOSIS — S50.811A: Primary | ICD-10-CM

## 2022-06-21 ENCOUNTER — APPOINTMENT (OUTPATIENT)
Dept: ENDOCRINOLOGY | Facility: CLINIC | Age: 87
End: 2022-06-21

## 2022-07-20 ENCOUNTER — APPOINTMENT (OUTPATIENT)
Dept: PULMONOLOGY | Facility: CLINIC | Age: 87
End: 2022-07-20

## 2022-07-20 VITALS
DIASTOLIC BLOOD PRESSURE: 80 MMHG | OXYGEN SATURATION: 91 % | WEIGHT: 195 LBS | TEMPERATURE: 96.7 F | SYSTOLIC BLOOD PRESSURE: 120 MMHG | HEART RATE: 100 BPM | HEIGHT: 64 IN | BODY MASS INDEX: 33.29 KG/M2

## 2022-07-20 PROCEDURE — 99214 OFFICE O/P EST MOD 30 MIN: CPT

## 2022-07-20 NOTE — HISTORY OF PRESENT ILLNESS
[FreeTextEntry1] : Followup COPD. [de-identified] : Patient with known COPD with an FEV1 of 49% of predicted. Patient is maintained on anoro. There have been no exacerbations of COPD. Patient denies shortness of breath at rest, cough or wheeze. She uses oxygen when she ambulates at home. She is mostly in a wheelchair. He only emergency room visit was on May 30 for a right arm infection. She was hospitalized. She has an aide 24-7. She has a history of Parkinson's disease. She does not go out of the home except for doctor visits. She would like to renew her oxygen. Current O2 sat of 88 on room air at rest.

## 2022-07-20 NOTE — ASSESSMENT
[FreeTextEntry1] : Patient with severe COPD. Patient has not had any exacerbations over the past 6 months. Her clinical condition is stable. Current O2 sat 88 on room air at rest. Patient is to continue use of home oxygen. Patient will follow up with me in 6 months.

## 2022-07-20 NOTE — PHYSICAL EXAM
[No Acute Distress] : no acute distress [Well Nourished] : well nourished [Well Developed] : well developed [Well-Appearing] : well-appearing [Normal Sclera/Conjunctiva] : normal sclera/conjunctiva [PERRL] : pupils equal round and reactive to light [EOMI] : extraocular movements intact [Normal Outer Ear/Nose] : the outer ears and nose were normal in appearance [Normal Oropharynx] : the oropharynx was normal [No JVD] : no jugular venous distention [No Lymphadenopathy] : no lymphadenopathy [Supple] : supple [Thyroid Normal, No Nodules] : the thyroid was normal and there were no nodules present [No Respiratory Distress] : no respiratory distress  [No Accessory Muscle Use] : no accessory muscle use [Normal Rate] : normal rate  [Regular Rhythm] : with a regular rhythm [Normal S1, S2] : normal S1 and S2 [No Murmur] : no murmur heard [No Carotid Bruits] : no carotid bruits [No Abdominal Bruit] : a ~M bruit was not heard ~T in the abdomen [No Varicosities] : no varicosities [Pedal Pulses Present] : the pedal pulses are present [No Edema] : there was no peripheral edema [No Palpable Aorta] : no palpable aorta [No Extremity Clubbing/Cyanosis] : no extremity clubbing/cyanosis [Soft] : abdomen soft [Non Tender] : non-tender [Non-distended] : non-distended [No Masses] : no abdominal mass palpated [No HSM] : no HSM [Normal Bowel Sounds] : normal bowel sounds [Normal Posterior Cervical Nodes] : no posterior cervical lymphadenopathy [Normal Anterior Cervical Nodes] : no anterior cervical lymphadenopathy [No Joint Swelling] : no joint swelling [Grossly Normal Strength/Tone] : grossly normal strength/tone [No Focal Deficits] : no focal deficits [Normal Gait] : normal gait [Normal Affect] : the affect was normal [Normal Insight/Judgement] : insight and judgment were intact [de-identified] : decreased bs's

## 2022-08-22 ENCOUNTER — APPOINTMENT (OUTPATIENT)
Dept: ENDOCRINOLOGY | Facility: CLINIC | Age: 87
End: 2022-08-22

## 2022-08-22 VITALS
DIASTOLIC BLOOD PRESSURE: 80 MMHG | HEART RATE: 65 BPM | OXYGEN SATURATION: 89 % | SYSTOLIC BLOOD PRESSURE: 120 MMHG | TEMPERATURE: 98 F

## 2022-08-22 LAB
ANION GAP SERPL CALC-SCNC: 18 MMOL/L
BUN SERPL-MCNC: 21 MG/DL
CALCIUM SERPL-MCNC: 9.6 MG/DL
CHLORIDE SERPL-SCNC: 100 MMOL/L
CO2 SERPL-SCNC: 21 MMOL/L
CREAT SERPL-MCNC: 0.78 MG/DL
EGFR: 75 ML/MIN/1.73M2
ESTIMATED AVERAGE GLUCOSE: 183 MG/DL
GLUCOSE SERPL-MCNC: 267 MG/DL
HBA1C MFR BLD HPLC: 8 %
POTASSIUM SERPL-SCNC: 4.6 MMOL/L
SODIUM SERPL-SCNC: 139 MMOL/L
T4 SERPL-MCNC: 8.2 UG/DL
TSH SERPL-ACNC: 0.74 UIU/ML

## 2022-08-22 PROCEDURE — 99214 OFFICE O/P EST MOD 30 MIN: CPT | Mod: 25

## 2022-08-22 PROCEDURE — 36415 COLL VENOUS BLD VENIPUNCTURE: CPT

## 2022-08-22 NOTE — HISTORY OF PRESENT ILLNESS
[FreeTextEntry1] : Aug 22, 2022       in person with Luis Alfredo and Sarah Biswas   \par \par PCP:  Dr. Cy Ruby   July 27, 2022  \par         Pulm:  Dr. Vasu Fernandez \par         Neurology:  Dr. Ciro Jimenez\par \par CC:   Diabetes    (11/9/21 [place] A1c 8.4 % ,  ,gd;.  TSH 0.4), Vit D 27.7)\par          (Hypothyroid)\par          (Parkinson's disease)\par \par 87 yo accompanied by her friend and caregiver, Sarah Biswas and Luis Alfredo.   Patient has Parkinson's Disease for about 25 years and requires a wheelchair.\par \par Medications include\par Metformin 500 mg BID and levothyroxine 75 mcg daily.    \par \par : :\par Constitutional:  Alert, well nourished, healthy appearance, no acute distress \par Eyes:  No proptosis, no stare\par Thyroid:\par Pulmonary:  No respiratory distress, no accessory muscle use; normal rate and effort\par Cardiac:  Normal rate\par Vascular: \par Endocrine:  No stigmata of Cushing’s Syndrome\par Musculoskeletal:  Normal gait, no involuntary movements\par Neurology:  No tremors\par Affect/Mood/Psych:  Oriented x 3; normal affect, normal insight/judgement, normal mood \par .\par \par Impression:  Hypothyroidism well controlled. 4/18/22 TSH 0.74 on LT4 75 mcg daily\par A1c on 4/18/22 and  suggests room for improvement.\par \par Plan:  Gave Luis Alfredo another Verio and she said she will assist patient in monitoring sugars at least once a day:\par AC breakfast and AC dinner - alternating.\par Add Prandin 1 mg BID AC (repaglinide) \par ROV by December or January.\par All Rx to [facility] now.  \par A1c today\par \par \par \par Apr 18, 2022      in person, accompanied by her friend and caregiver\par \par PCP:  Dr. Cy Ruby\par         Pulm:  Dr. Vasu Fernandez \par         Neurology:  Dr. Ciro Jimenez\par \par CC:   Diabetes    (11/9/21 [place] A1c 8.4 % ,  ,gd;.  TSH 0.4), Vit D 27.7)\par          (Hypothyroid)\par          (Partkinson's disease)\par \par 87 yo accompanied by her friend and caregiver, Sarah Biswas.   Patient has Parkinson's Disease for about 25 years and requires a wheelchair.\par \par Since last visit, obtained a OneTouch Verio meter, but was having difficulty using it.\par \par Verio shows BS today 222 m/gdl. \par \par : :\par Constitutional:  Alert, well nourished, healthy appearance, no acute distress \par Eyes:  No proptosis, no stare\par Thyroid:\par Pulmonary:  No respiratory distress, no accessory muscle use; normal rate and effort\par Cardiac:  Normal rate\par Vascular: \par Endocrine:  No stigmata of Cushing’s Syndrome\par Musculoskeletal:  Normal gait, no involuntary movements\par Neurology:  No tremors\par Affect/Mood/Psych:  Oriented x 3; normal affect, normal insight/judgement, normal mood \par .\par \par Impression: Diabetes under loose control.\par \par Plan:  She will start testing three meals a week.\par Bring results to next viisit in August.\par A1c today.  \par \par \par \par \par Feb 23, 2022        in wheelchair accompanied by friend and caregiver   Sarah Biwsas (rel to [facility])    [phone] \par                                   as well as her home health aide\par \par \par PCP:  Dr. Cy Ruby\par         Pulm:  Dr. Vasu Fernandez \par         Neurology:  Dr. Ciro Jimenez\par \par CC:   Diabetes    (11/9/21 [place] A1c 8.4 % ,  ,gd;.  TSH 0.4), Vit D 27.7)\par          (Hypothyroid)\par          (Partkinson's disease)\par \par 87 yo accompanied by her friend and caregiver, Sarah Biswas.   Patient has Parkinson's Disease for about 25 years and requires a wheelchair.\par \par She brings in a list of her medications which includes\par :\par  Metformin 500 mg BID\par Levothyroxine 75 mcg daily\par atrovastain 10 mg daily\par lisinopril 30 mg daily\par amlodipine 5 mg daily\par lexapro 10 mg daily\par albuterol\par anoro ellipta\par donepezil 5 mg in PM   \par \par A1c  8.4 \par \par Impression:  Blood sugar in mid 200's after breakfast.\par \par Plan:  Reviewed ADA gudielines and strategies to achieve these.\par Asked her aide to assist in  monitoring sugars for 3 meals a week.\par ROV in 2 and 4 months.\par \par Thank you.  -jh

## 2022-08-30 LAB
ANION GAP SERPL CALC-SCNC: 12 MMOL/L
BUN SERPL-MCNC: 25 MG/DL
CALCIUM SERPL-MCNC: 9.7 MG/DL
CHLORIDE SERPL-SCNC: 101 MMOL/L
CO2 SERPL-SCNC: 27 MMOL/L
CREAT SERPL-MCNC: 0.89 MG/DL
EGFR: 64 ML/MIN/1.73M2
ESTIMATED AVERAGE GLUCOSE: 209 MG/DL
GLUCOSE SERPL-MCNC: 303 MG/DL
HBA1C MFR BLD HPLC: 8.9 %
POTASSIUM SERPL-SCNC: 5.2 MMOL/L
SODIUM SERPL-SCNC: 140 MMOL/L

## 2022-10-31 NOTE — END OF VISIT
[Time Spent: ___ minutes] : I have spent [unfilled] minutes of time on the encounter.
hard copy, drawn during this pregnancy

## 2022-11-09 ENCOUNTER — HOSPITAL ENCOUNTER (EMERGENCY)
Dept: HOSPITAL 74 - FER | Age: 87
LOS: 1 days | Discharge: HOME | End: 2022-11-10
Payer: COMMERCIAL

## 2022-11-09 VITALS — BODY MASS INDEX: 30 KG/M2

## 2022-11-09 DIAGNOSIS — U07.1: Primary | ICD-10-CM

## 2022-11-09 DIAGNOSIS — N39.0: ICD-10-CM

## 2022-11-09 DIAGNOSIS — R41.82: ICD-10-CM

## 2022-11-09 LAB
ALBUMIN SERPL-MCNC: 3.8 G/DL (ref 3.4–5)
ALP SERPL-CCNC: 103 U/L (ref 45–117)
ALT SERPL-CCNC: 5 U/L (ref 13–61)
ANION GAP SERPL CALC-SCNC: 9 MMOL/L (ref 8–16)
AST SERPL-CCNC: 35 U/L (ref 15–37)
BASE EXCESS BLDV CALC-SCNC: 0.1 MMOL/L (ref -2–2)
BILIRUB SERPL-MCNC: 0.9 MG/DL (ref 0.2–1)
BUN SERPL-MCNC: 21 MG/DL (ref 7–18)
CALCIUM SERPL-MCNC: 8.9 MG/DL (ref 8.5–10)
CHLORIDE SERPL-SCNC: 98 MMOL/L (ref 98–107)
CO2 SERPL-SCNC: 29 MMOL/L (ref 21–32)
CREAT SERPL-MCNC: 1.2 MG/DL (ref 0.55–1.3)
DEPRECATED RDW RBC AUTO: 14.5 % (ref 11.6–15.6)
EPITH CASTS URNS QL MICRO: (no result) /HPF
GLUCOSE SERPL-MCNC: 226 MG/DL (ref 74–106)
HCT VFR BLD CALC: 42.7 % (ref 32.4–45.2)
HCT VFR BLDV CALC: 44 % (ref 32.4–45.2)
HGB BLD-MCNC: 13.9 G/DL (ref 10.7–15.3)
MCH RBC QN AUTO: 30.2 PG (ref 25.7–33.7)
MCHC RBC AUTO-ENTMCNC: 32.5 G/DL (ref 32–36)
MCV RBC: 92.8 FL (ref 80–96)
PCO2 BLDV: 61.9 MMHG (ref 38–52)
PH BLDV: 7.28 [PH] (ref 7.31–7.41)
PLATELET # BLD AUTO: 212.6 10^3/UL (ref 134–434)
PLATELET BLD QL SMEAR: ADEQUATE
PMV BLD: 9.6 FL (ref 7.5–11.1)
PROT SERPL-MCNC: 6.8 G/DL (ref 6.4–8.2)
RBC # BLD AUTO: 4.6 10^6/UL (ref 3.6–5.2)
SAO2 % BLDV: 75.9 % (ref 70–80)
SODIUM SERPL-SCNC: 136 MMOL/L (ref 136–145)
WBC # BLD AUTO: 20.1 10^3/UL (ref 4–10.8)

## 2022-11-09 PROCEDURE — U0003 INFECTIOUS AGENT DETECTION BY NUCLEIC ACID (DNA OR RNA); SEVERE ACUTE RESPIRATORY SYNDROME CORONAVIRUS 2 (SARS-COV-2) (CORONAVIRUS DISEASE [COVID-19]), AMPLIFIED PROBE TECHNIQUE, MAKING USE OF HIGH THROUGHPUT TECHNOLOGIES AS DESCRIBED BY CMS-2020-01-R: HCPCS

## 2022-11-09 PROCEDURE — 3E0333Z INTRODUCTION OF ANTI-INFLAMMATORY INTO PERIPHERAL VEIN, PERCUTANEOUS APPROACH: ICD-10-PCS

## 2022-11-09 PROCEDURE — U0005 INFEC AGEN DETEC AMPLI PROBE: HCPCS

## 2022-11-09 PROCEDURE — 3E03329 INTRODUCTION OF OTHER ANTI-INFECTIVE INTO PERIPHERAL VEIN, PERCUTANEOUS APPROACH: ICD-10-PCS

## 2022-11-09 RX ADMIN — CARBIDOPA AND LEVODOPA SCH EACH: 25; 250 TABLET ORAL at 18:37

## 2022-11-09 RX ADMIN — INSULIN ASPART SCH: 100 INJECTION, SOLUTION INTRAVENOUS; SUBCUTANEOUS at 18:33

## 2022-11-10 VITALS
SYSTOLIC BLOOD PRESSURE: 128 MMHG | HEART RATE: 74 BPM | DIASTOLIC BLOOD PRESSURE: 50 MMHG | RESPIRATION RATE: 19 BRPM | TEMPERATURE: 98.7 F

## 2022-11-10 LAB
ALBUMIN SERPL-MCNC: 3.2 G/DL (ref 3.4–5)
ALP SERPL-CCNC: 94 U/L (ref 45–117)
ALT SERPL-CCNC: 11 U/L (ref 13–61)
ANION GAP SERPL CALC-SCNC: 8 MMOL/L (ref 8–16)
AST SERPL-CCNC: 50 U/L (ref 15–37)
BILIRUB SERPL-MCNC: 0.8 MG/DL (ref 0.2–1)
BUN SERPL-MCNC: 19 MG/DL (ref 7–18)
CALCIUM SERPL-MCNC: 8.7 MG/DL (ref 8.5–10)
CHLORIDE SERPL-SCNC: 104 MMOL/L (ref 98–107)
CO2 SERPL-SCNC: 27 MMOL/L (ref 21–32)
CREAT SERPL-MCNC: 1.1 MG/DL (ref 0.55–1.3)
DEPRECATED RDW RBC AUTO: 14.2 % (ref 11.6–15.6)
GLUCOSE SERPL-MCNC: 124 MG/DL (ref 74–106)
HCT VFR BLD CALC: 39.7 % (ref 32.4–45.2)
HGB BLD-MCNC: 12.8 G/DL (ref 10.7–15.3)
MAGNESIUM SERPL-MCNC: 1.7 MG/DL (ref 1.8–2.4)
MCH RBC QN AUTO: 30.3 PG (ref 25.7–33.7)
MCHC RBC AUTO-ENTMCNC: 32.2 G/DL (ref 32–36)
MCV RBC: 93.9 FL (ref 80–96)
PHOSPHATE SERPL-MCNC: 3.8 MG/DL (ref 2.5–4.9)
PLATELET # BLD AUTO: 156.3 10^3/UL (ref 134–434)
PMV BLD: 9.9 FL (ref 7.5–11.1)
PROT SERPL-MCNC: 6.3 G/DL (ref 6.4–8.2)
RBC # BLD AUTO: 4.23 10^6/UL (ref 3.6–5.2)
SODIUM SERPL-SCNC: 139 MMOL/L (ref 136–145)
WBC # BLD AUTO: 16.4 10^3/UL (ref 4–10.8)

## 2022-11-10 RX ADMIN — CARBIDOPA AND LEVODOPA SCH EACH: 25; 250 TABLET ORAL at 10:45

## 2022-11-10 RX ADMIN — INSULIN ASPART SCH: 100 INJECTION, SOLUTION INTRAVENOUS; SUBCUTANEOUS at 11:15

## 2022-11-10 RX ADMIN — RIVASTIGMINE TARTRATE SCH MG: 1.5 CAPSULE ORAL at 10:45

## 2022-11-10 RX ADMIN — INSULIN ASPART SCH: 100 INJECTION, SOLUTION INTRAVENOUS; SUBCUTANEOUS at 07:07

## 2022-11-10 RX ADMIN — INSULIN ASPART SCH: 100 INJECTION, SOLUTION INTRAVENOUS; SUBCUTANEOUS at 00:37

## 2022-11-10 RX ADMIN — CARBIDOPA AND LEVODOPA SCH EACH: 25; 250 TABLET ORAL at 00:09

## 2022-11-10 RX ADMIN — RIVASTIGMINE TARTRATE SCH: 1.5 CAPSULE ORAL at 04:02

## 2022-12-19 ENCOUNTER — APPOINTMENT (OUTPATIENT)
Dept: ENDOCRINOLOGY | Facility: CLINIC | Age: 87
End: 2022-12-19

## 2022-12-19 VITALS
HEART RATE: 94 BPM | DIASTOLIC BLOOD PRESSURE: 62 MMHG | WEIGHT: 190 LBS | BODY MASS INDEX: 32.44 KG/M2 | HEIGHT: 64 IN | SYSTOLIC BLOOD PRESSURE: 112 MMHG | OXYGEN SATURATION: 94 %

## 2022-12-19 PROCEDURE — 99214 OFFICE O/P EST MOD 30 MIN: CPT

## 2022-12-19 NOTE — HISTORY OF PRESENT ILLNESS
[FreeTextEntry1] : Dec 19, 2022     in person accompanied by Sarah Ruiz  (p )\par \par PCP:  Dr. Cy Ruby   July 27, 2022  \par         Pulm:  Dr. Vasu Fernandez \par         Neurology:  Dr. Ciro Jimenez\par \par CC:   Diabetes    (11/9/21 [place] A1c 8.4 % ,  ,gd;.  TSH 0.4), Vit D 27.7)            8/2022 A1c 8.9  \par          (Hypothyroid)\par          (Parkinson's disease)\par \par 86 yo accompanied by her friend and caregiver, Sarah Landayokasta and Luis Alfredo.   Patient has Parkinson's Disease for about 25 years and requires a wheelchair.\par \par Medications include:\par Metformin 500 mg BID \par re;aglinide 1 mg BID AC \par  levothyroxine 75 mcg daily.   \par .\par atrovastatin 10 mg\par lisinopril 30 mg\par amlodipine 5 mg\par carbidopa/levodopa\par lexapro\par albuterol\par Anoro Ellipta inhaler\par Exelone 1.5 mg BID  \par \par : :\par Constitutional:  Alert, well nourished, healthy appearance, no acute distress \par Eyes:  No proptosis, no stare\par Thyroid:\par Pulmonary:  No respiratory distress, no accessory muscle use; normal rate and effort\par Cardiac:  Normal rate\par Vascular: \par Endocrine:  No stigmata of Cushing’s Syndrome\par Musculoskeletal:  Normal gait, no involuntary movements\par Neurology:  No tremors\par Affect/Mood/Psych:  Oriented x 3; normal affect, normal insight/judgement, normal mood \par .\par \par Impression:  FBS runs 150-170 mg;/dl, so will add glipizide ER 2.5 mg in PM if still high by next visit in May.  \par After meals is also routinely elevated, so will increase the repaglinide to 2 mg AC meals.\par ROV by May 2023.  \par \par \par Aug 22, 2022       in person with Luis Alfredo and Sarah Zulay   \par \par PCP:  Dr. Cy Ruby   July 27, 2022  \par         Pulm:  Dr. Vasu Fernandez \par         Neurology:  Dr. Ciro Jimenez\par \par CC:   Diabetes    (11/9/21 [place] A1c 8.4 % ,  ,gd;.  TSH 0.4), Vit D 27.7)\par          (Hypothyroid)\par          (Parkinson's disease)\par \par 87 yo accompanied by her friend and caregiver, Sarahshayna Biswas and Luis Alfredo.   Patient has Parkinson's Disease for about 25 years and requires a wheelchair.\par \par Medications include\par Metformin 500 mg BID and levothyroxine 75 mcg daily.    \par \par : :\par Constitutional:  Alert, well nourished, healthy appearance, no acute distress \par Eyes:  No proptosis, no stare\par Thyroid:\par Pulmonary:  No respiratory distress, no accessory muscle use; normal rate and effort\par Cardiac:  Normal rate\par Vascular: \par Endocrine:  No stigmata of Cushing’s Syndrome\par Musculoskeletal:  Normal gait, no involuntary movements\par Neurology:  No tremors\par Affect/Mood/Psych:  Oriented x 3; normal affect, normal insight/judgement, normal mood \par .\par \par Impression:  Hypothyroidism well controlled. 4/18/22 TSH 0.74 on LT4 75 mcg daily\par A1c on 4/18/22 and  suggests room for improvement.\par \par Plan:  Gave Luis Alfredo another Verio and she said she will assist patient in monitoring sugars at least once a day:\par AC breakfast and AC dinner - alternating.\par Add Prandin 1 mg BID AC (repaglinide) \par ROV by December or January.\par All Rx to [facility] now.  \par A1c today\par \par \par \par Apr 18, 2022      in person, accompanied by her friend and caregiver\par \par PCP:  Dr. Cy Ruby\par         Pulm:  Dr. Vasu Fernandez \par         Neurology:  Dr. Ciro Jimenez\par \par CC:   Diabetes    (11/9/21 [place] A1c 8.4 % ,  ,gd;.  TSH 0.4), Vit D 27.7)\par          (Hypothyroid)\par          (Partkinson's disease)\par \par 87 yo accompanied by her friend and caregiver, Sarah Biswas.   Patient has Parkinson's Disease for about 25 years and requires a wheelchair.\par \par Since last visit, obtained a OneTouch Verio meter, but was having difficulty using it.\par \par Verio shows BS today 222 m/gdl. \par \par : :\par Constitutional:  Alert, well nourished, healthy appearance, no acute distress \par Eyes:  No proptosis, no stare\par Thyroid:\par Pulmonary:  No respiratory distress, no accessory muscle use; normal rate and effort\par Cardiac:  Normal rate\par Vascular: \par Endocrine:  No stigmata of Cushing’s Syndrome\par Musculoskeletal:  Normal gait, no involuntary movements\par Neurology:  No tremors\par Affect/Mood/Psych:  Oriented x 3; normal affect, normal insight/judgement, normal mood \par .\par \par Impression: Diabetes under loose control.\par \par Plan:  She will start testing three meals a week.\par Bring results to next viisit in August.\par A1c today.  \par \par \par \par \par Feb 23, 2022        in wheelchair accompanied by friend and caregiver   Sarah Biswas (rel to [facility])    [phone] \par                                   as well as her home health aide\par \par \par PCP:  Dr. Cy Ruby\par         Pulm:  Dr. Vasu Fernandez \par         Neurology:  Dr. Ciro Jimenez\par \par CC:   Diabetes    (11/9/21 [place] A1c 8.4 % ,  ,gd;.  TSH 0.4), Vit D 27.7)\par          (Hypothyroid)\par          (Partkinson's disease)\par \par 87 yo accompanied by her friend and caregiver, Sarah Biswas.   Patient has Parkinson's Disease for about 25 years and requires a wheelchair.\par \par She brings in a list of her medications which includes\par :\par  Metformin 500 mg BID\par Levothyroxine 75 mcg daily\par atrovastain 10 mg daily\par lisinopril 30 mg daily\par amlodipine 5 mg daily\par lexapro 10 mg daily\par albuterol\par anoro ellipta\par donepezil 5 mg in PM   \par \par A1c  8.4 \par \par Impression:  Blood sugar in mid 200's after breakfast.\par \par Plan:  Reviewed ADA gudielines and strategies to achieve these.\par Asked her aide to assist in  monitoring sugars for 3 meals a week.\par ROV in 2 and 4 months.\par \par Thank you.  -jh

## 2023-02-08 RX ORDER — UMECLIDINIUM BROMIDE AND VILANTEROL TRIFENATATE 62.5; 25 UG/1; UG/1
62.5-25 POWDER RESPIRATORY (INHALATION)
Qty: 3 | Refills: 3 | Status: ACTIVE | COMMUNITY
Start: 2021-06-11 | End: 1900-01-01

## 2023-02-15 ENCOUNTER — APPOINTMENT (OUTPATIENT)
Dept: PULMONOLOGY | Facility: CLINIC | Age: 88
End: 2023-02-15
Payer: MEDICARE

## 2023-02-15 VITALS
OXYGEN SATURATION: 95 % | TEMPERATURE: 96.7 F | WEIGHT: 190 LBS | HEIGHT: 64 IN | SYSTOLIC BLOOD PRESSURE: 110 MMHG | BODY MASS INDEX: 32.44 KG/M2 | HEART RATE: 75 BPM | DIASTOLIC BLOOD PRESSURE: 60 MMHG

## 2023-02-15 PROCEDURE — 99214 OFFICE O/P EST MOD 30 MIN: CPT

## 2023-02-15 NOTE — PHYSICAL EXAM
[No Acute Distress] : no acute distress [Well Nourished] : well nourished [Well Developed] : well developed [Well-Appearing] : well-appearing [Normal Sclera/Conjunctiva] : normal sclera/conjunctiva [PERRL] : pupils equal round and reactive to light [EOMI] : extraocular movements intact [Normal Outer Ear/Nose] : the outer ears and nose were normal in appearance [Normal Oropharynx] : the oropharynx was normal [No JVD] : no jugular venous distention [No Lymphadenopathy] : no lymphadenopathy [Supple] : supple [Thyroid Normal, No Nodules] : the thyroid was normal and there were no nodules present [No Respiratory Distress] : no respiratory distress  [No Accessory Muscle Use] : no accessory muscle use [Normal Rate] : normal rate  [Regular Rhythm] : with a regular rhythm [Normal S1, S2] : normal S1 and S2 [No Murmur] : no murmur heard [No Carotid Bruits] : no carotid bruits [No Abdominal Bruit] : a ~M bruit was not heard ~T in the abdomen [No Varicosities] : no varicosities [Pedal Pulses Present] : the pedal pulses are present [No Palpable Aorta] : no palpable aorta [No Extremity Clubbing/Cyanosis] : no extremity clubbing/cyanosis [Soft] : abdomen soft [Non Tender] : non-tender [Non-distended] : non-distended [No Masses] : no abdominal mass palpated [No HSM] : no HSM [Normal Bowel Sounds] : normal bowel sounds [No Focal Deficits] : no focal deficits [Normal Gait] : normal gait [Normal Affect] : the affect was normal [Normal Insight/Judgement] : insight and judgment were intact [de-identified] : decreased bs's [de-identified] : 2+ pretibial edema

## 2023-02-15 NOTE — HISTORY OF PRESENT ILLNESS
[FreeTextEntry1] : Followup COPD [de-identified] : Patient with a history of Parkinson's disease and dementia. She has a live-in caretaker 24-7. She has severe COPD. There have been no exacerbations. No hospitalizations. She states her breathing is fine. She uses oxygen with ambulation and at night. She is on anoro. She walks short distances with a walker at home without shortness of breath. Current O2 sat 92 on room air at rest.

## 2023-02-15 NOTE — HISTORY OF PRESENT ILLNESS
[FreeTextEntry1] : Followup COPD [de-identified] : Patient with a history of Parkinson's disease and dementia. She has a live-in caretaker 24-7. She has severe COPD. There have been no exacerbations. No hospitalizations. She states her breathing is fine. She uses oxygen with ambulation and at night. She is on anoro. She walks short distances with a walker at home without shortness of breath. Current O2 sat 92 on room air at rest.

## 2023-02-15 NOTE — ASSESSMENT
[FreeTextEntry1] : Patient with severe COPD clinically stable over the past 6 months. There've been no exacerbations. Current O2 sat 92 on room air at rest. Patient is to continue current therapy and return to see me in 6 months

## 2023-02-15 NOTE — PHYSICAL EXAM
[No Acute Distress] : no acute distress [Well Nourished] : well nourished [Well Developed] : well developed [Well-Appearing] : well-appearing [Normal Sclera/Conjunctiva] : normal sclera/conjunctiva [PERRL] : pupils equal round and reactive to light [EOMI] : extraocular movements intact [Normal Outer Ear/Nose] : the outer ears and nose were normal in appearance [Normal Oropharynx] : the oropharynx was normal [No JVD] : no jugular venous distention [No Lymphadenopathy] : no lymphadenopathy [Supple] : supple [Thyroid Normal, No Nodules] : the thyroid was normal and there were no nodules present [No Respiratory Distress] : no respiratory distress  [No Accessory Muscle Use] : no accessory muscle use [Normal Rate] : normal rate  [Regular Rhythm] : with a regular rhythm [Normal S1, S2] : normal S1 and S2 [No Murmur] : no murmur heard [No Carotid Bruits] : no carotid bruits [No Abdominal Bruit] : a ~M bruit was not heard ~T in the abdomen [No Varicosities] : no varicosities [Pedal Pulses Present] : the pedal pulses are present [No Palpable Aorta] : no palpable aorta [No Extremity Clubbing/Cyanosis] : no extremity clubbing/cyanosis [Soft] : abdomen soft [Non Tender] : non-tender [Non-distended] : non-distended [No Masses] : no abdominal mass palpated [No HSM] : no HSM [Normal Bowel Sounds] : normal bowel sounds [No Focal Deficits] : no focal deficits [Normal Gait] : normal gait [Normal Affect] : the affect was normal [Normal Insight/Judgement] : insight and judgment were intact [de-identified] : decreased bs's [de-identified] : 2+ pretibial edema

## 2023-05-22 ENCOUNTER — APPOINTMENT (OUTPATIENT)
Dept: ENDOCRINOLOGY | Facility: CLINIC | Age: 88
End: 2023-05-22
Payer: MEDICARE

## 2023-05-22 VITALS
HEART RATE: 80 BPM | WEIGHT: 190 LBS | HEIGHT: 64 IN | DIASTOLIC BLOOD PRESSURE: 68 MMHG | SYSTOLIC BLOOD PRESSURE: 122 MMHG | OXYGEN SATURATION: 94 % | BODY MASS INDEX: 32.44 KG/M2

## 2023-05-22 PROCEDURE — 99214 OFFICE O/P EST MOD 30 MIN: CPT

## 2023-05-22 NOTE — HISTORY OF PRESENT ILLNESS
[FreeTextEntry1] : May 22, 2023    in person with Luis Alfredo and Sarah\par \par PCP:  Dr. Cy Ruby   July 27, 2022  \par         Pulm:  Dr. Vasu Fernandez \par         Neurology:  Dr. Ciro Jimenez\par \par CC:   Diabetes    (11/9/21 Wirt A1c 8.4 % ,  ,gd;.  TSH 0.4), Vit D 27.7)            8/2022 A1c 8.9  \par          (Hypothyroid)\par          (Parkinson's disease)\par \par 86 yo accompanied by her friend and caregiver, Sarah Biswas and Luis Alfredo.   Patient has Parkinson's Disease for about 25 years and requires a wheelchair.\par \par Medications include:\par Metformin 500 mg BID \par repaglinide 2 mg TID AC\par  levothyroxine 75 mcg daily.   \par \par : :\par Constitutional:  Alert, well nourished, healthy appearance, no acute distress \par Eyes:  No proptosis, no stare\par Thyroid:\par Pulmonary:  No respiratory distress, no accessory muscle use; normal rate and effort\par Cardiac:  Normal rate\par Vascular: \par Endocrine:  No stigmata of Cushing’s Syndrome\par Musculoskeletal:  Normal gait, no involuntary movements\par Neurology:  No tremors\par Affect/Mood/Psych:  Oriented x 3; normal affect, normal insight/judgement, normal mood \par .\par \par Imp:  Luis Alfredo reports FBS around 180.\par Plan:  Add glipizide ER 2.5 mg after evening meal to improve FBS.\par ROV by January.   Luis Alfredo will bring records to visit.  \par \par \par \par Dec 19, 2022     in person accompanied by Sarah Ruiz  (p )\par \par PCP:  Dr. Cy Ruby   July 27, 2022  \par         Pulm:  Dr. Vasu Fernandez \par         Neurology:  Dr. Ciro Jimenez\par \par CC:   Diabetes    (11/9/21 Wirt A1c 8.4 % ,  ,gd;.  TSH 0.4), Vit D 27.7)            8/2022 A1c 8.9  \par          (Hypothyroid)\par          (Parkinson's disease)\par \par 86 yo accompanied by her friend and caregiver, Sarah Mandujanozac and Luis Alfredo.   Patient has Parkinson's Disease for about 25 years and requires a wheelchair.\par \par Medications include:\par Metformin 500 mg BID \par re;aglinide 1 mg BID AC \par  levothyroxine 75 mcg daily.   \par .\par atrovastatin 10 mg\par lisinopril 30 mg\par amlodipine 5 mg\par carbidopa/levodopa\par lexapro\par albuterol\par Anoro Ellipta inhaler\par Exelone 1.5 mg BID  \par \par : :\par Constitutional:  Alert, well nourished, healthy appearance, no acute distress \par Eyes:  No proptosis, no stare\par Thyroid:\par Pulmonary:  No respiratory distress, no accessory muscle use; normal rate and effort\par Cardiac:  Normal rate\par Vascular: \par Endocrine:  No stigmata of Cushing’s Syndrome\par Musculoskeletal:  Normal gait, no involuntary movements\par Neurology:  No tremors\par Affect/Mood/Psych:  Oriented x 3; normal affect, normal insight/judgement, normal mood \par .\par \par Impression:  FBS runs 150-170 mg;/dl, so will add glipizide ER 2.5 mg in PM if still high by next visit in May.  \par After meals is also routinely elevated, so will increase the repaglinide to 2 mg AC meals.\par ROV by May 2023.  \par \par \par Aug 22, 2022       in person with Luis Alfredo and Sarah Mandujanozac   \par \par PCP:  Dr. Cy Ruby   July 27, 2022  \par         Pulm:  Dr. Vasu Fernandez \par         Neurology:  Dr. Ciro Jimenez\par \par CC:   Diabetes    (11/9/21 Wirt A1c 8.4 % ,  ,gd;.  TSH 0.4), Vit D 27.7)\par          (Hypothyroid)\par          (Parkinson's disease)\par \par 87 yo accompanied by her friend and caregiver, Sarah Mandujanozac and Luis Alfredo.   Patient has Parkinson's Disease for about 25 years and requires a wheelchair.\par \par Medications include\par Metformin 500 mg BID and levothyroxine 75 mcg daily.    \par \par : :\par Constitutional:  Alert, well nourished, healthy appearance, no acute distress \par Eyes:  No proptosis, no stare\par Thyroid:\par Pulmonary:  No respiratory distress, no accessory muscle use; normal rate and effort\par Cardiac:  Normal rate\par Vascular: \par Endocrine:  No stigmata of Cushing’s Syndrome\par Musculoskeletal:  Normal gait, no involuntary movements\par Neurology:  No tremors\par Affect/Mood/Psych:  Oriented x 3; normal affect, normal insight/judgement, normal mood \par .\par \par Impression:  Hypothyroidism well controlled. 4/18/22 TSH 0.74 on LT4 75 mcg daily\par A1c on 4/18/22 and  suggests room for improvement.\par \par Plan:  Gave Luis Alfredo another Verio and she said she will assist patient in monitoring sugars at least once a day:\par AC breakfast and AC dinner - alternating.\par Add Prandin 1 mg BID AC (repaglinide) \par ROV by December or January.\par All Rx to [facility] now.  \par A1c today\par \par \par \par Apr 18, 2022      in person, accompanied by her friend and caregiver\par \par PCP:  Dr. Cy Ruby\par         Pulm:  Dr. Vasu Fernandez \par         Neurology:  Dr. Ciro Jimenez\par \par CC:   Diabetes    (11/9/21 Wirt A1c 8.4 % ,  ,gd;.  TSH 0.4), Vit D 27.7)\par          (Hypothyroid)\par          (Partkinson's disease)\par \par 87 yo accompanied by her friend and caregiver, Sarah Biswas.   Patient has Parkinson's Disease for about 25 years and requires a wheelchair.\par \par Since last visit, obtained a OneTouch Verio meter, but was having difficulty using it.\par \par Verio shows BS today 222 m/gdl. \par \par : :\par Constitutional:  Alert, well nourished, healthy appearance, no acute distress \par Eyes:  No proptosis, no stare\par Thyroid:\par Pulmonary:  No respiratory distress, no accessory muscle use; normal rate and effort\par Cardiac:  Normal rate\par Vascular: \par Endocrine:  No stigmata of Cushing’s Syndrome\par Musculoskeletal:  Normal gait, no involuntary movements\par Neurology:  No tremors\par Affect/Mood/Psych:  Oriented x 3; normal affect, normal insight/judgement, normal mood \par .\par \par Impression: Diabetes under loose control.\par \par Plan:  She will start testing three meals a week.\par Bring results to next viisit in August.\par A1c today.  \par \par \par \par \par Feb 23, 2022        in wheelchair accompanied by friend and caregiver   Sarah Biswas (rel to [facility])    [phone] \par                                   as well as her home health aide\par \par \par PCP:  Dr. Cy Ruby\par         Pulm:  Dr. Vasu Fernandez \par         Neurology:  Dr. Ciro Jimenez\par \par CC:   Diabetes    (11/9/21 Wirt A1c 8.4 % ,  ,gd;.  TSH 0.4), Vit D 27.7)\par          (Hypothyroid)\par          (Partkinson's disease)\par \par 87 yo accompanied by her friend and caregiver, Sarah Biswas.   Patient has Parkinson's Disease for about 25 years and requires a wheelchair.\par \par She brings in a list of her medications which includes\par :\par  Metformin 500 mg BID\par Levothyroxine 75 mcg daily\par atrovastain 10 mg daily\par lisinopril 30 mg daily\par amlodipine 5 mg daily\par lexapro 10 mg daily\par albuterol\par anoro ellipta\par donepezil 5 mg in PM   \par \par A1c  8.4 \par \par Impression:  Blood sugar in mid 200's after breakfast.\par \par Plan:  Reviewed ADA gudielines and strategies to achieve these.\par Asked her aide to assist in  monitoring sugars for 3 meals a week.\par ROV in 2 and 4 months.\par \par Thank you.  -duong

## 2023-06-19 ENCOUNTER — NON-APPOINTMENT (OUTPATIENT)
Age: 88
End: 2023-06-19

## 2023-06-26 RX ORDER — TIOTROPIUM BROMIDE AND OLODATEROL 3.124; 2.736 UG/1; UG/1
2.5-2.5 SPRAY, METERED RESPIRATORY (INHALATION)
Qty: 3 | Refills: 3 | Status: ACTIVE | COMMUNITY
Start: 2023-06-26 | End: 1900-01-01

## 2023-08-16 ENCOUNTER — APPOINTMENT (OUTPATIENT)
Dept: PULMONOLOGY | Facility: CLINIC | Age: 88
End: 2023-08-16
Payer: MEDICARE

## 2023-08-16 VITALS
DIASTOLIC BLOOD PRESSURE: 70 MMHG | SYSTOLIC BLOOD PRESSURE: 140 MMHG | OXYGEN SATURATION: 89 % | HEART RATE: 75 BPM | BODY MASS INDEX: 32.44 KG/M2 | HEIGHT: 64 IN | WEIGHT: 190 LBS

## 2023-08-16 PROCEDURE — 99213 OFFICE O/P EST LOW 20 MIN: CPT

## 2023-08-16 NOTE — ASSESSMENT
[FreeTextEntry1] : Patient with severe COPD clinically stable.  There have been no exacerbations over the past 6 months.  Current O2 sat 89 on room air at rest.  Patient is advised to use home oxygen with sleep and with walking.  She is to return in 6 months.  She will call me if there are any problems with her respiratory status.

## 2023-08-16 NOTE — PHYSICAL EXAM
[No Acute Distress] : no acute distress [Normal Oropharynx] : normal oropharynx [Normal Appearance] : normal appearance [No Neck Mass] : no neck mass [Normal Rate/Rhythm] : normal rate/rhythm [Normal S1, S2] : normal s1, s2 [No Murmurs] : no murmurs [No Resp Distress] : no resp distress [No Abnormalities] : no abnormalities [Benign] : benign [Normal Gait] : normal gait [No Clubbing] : no clubbing [No Cyanosis] : no cyanosis [FROM] : FROM [1+ Pitting] : 1+ pitting [Normal Color/ Pigmentation] : normal color/ pigmentation [No Focal Deficits] : no focal deficits [Oriented x3] : oriented x3 [Normal Affect] : normal affect [TextBox_68] : decreased bs's

## 2023-08-16 NOTE — HISTORY OF PRESENT ILLNESS
[TextBox_4] : 87-year-old female with history of Parkinson's disease and dementia.  She is known to have severe COPD.  She has borderline hypoxemia.  She has home oxygen which she uses with ambulation.  She walks short distances at home with a walker.  She does not use oxygen with sleep.  Her current O2 sat at rest on room air is 89.  Over the past 6 months there have been no hospitalizations or ER visits.  No exacerbations.  She denies complaints.  She is not using oxygen when she sleeps.  Is unclear why.  She does not like to use it at nighttime.  Her medications were changed a bit.  She is currently on Stiolto instead of Anoro.

## 2023-09-20 ENCOUNTER — RX RENEWAL (OUTPATIENT)
Age: 88
End: 2023-09-20

## 2023-09-20 RX ORDER — METFORMIN HYDROCHLORIDE 500 MG/1
500 TABLET, COATED ORAL
Qty: 180 | Refills: 0 | Status: ACTIVE | COMMUNITY
Start: 2023-09-20 | End: 1900-01-01

## 2023-09-25 ENCOUNTER — APPOINTMENT (OUTPATIENT)
Dept: ENDOCRINOLOGY | Facility: CLINIC | Age: 88
End: 2023-09-25
Payer: MEDICARE

## 2023-09-25 VITALS
OXYGEN SATURATION: 98 % | SYSTOLIC BLOOD PRESSURE: 118 MMHG | WEIGHT: 190 LBS | HEIGHT: 64 IN | DIASTOLIC BLOOD PRESSURE: 70 MMHG | BODY MASS INDEX: 32.44 KG/M2 | HEART RATE: 63 BPM

## 2023-09-25 PROCEDURE — 99214 OFFICE O/P EST MOD 30 MIN: CPT

## 2023-09-25 RX ORDER — LANCETS 33 GAUGE
EACH MISCELLANEOUS
Qty: 100 | Refills: 3 | Status: ACTIVE | COMMUNITY
Start: 2022-02-23 | End: 1900-01-01

## 2023-09-25 RX ORDER — METFORMIN HYDROCHLORIDE 500 MG/1
500 TABLET, COATED ORAL
Qty: 180 | Refills: 3 | Status: ACTIVE | COMMUNITY
Start: 2022-08-22 | End: 1900-01-01

## 2023-12-08 ENCOUNTER — HOSPITAL ENCOUNTER (INPATIENT)
Dept: HOSPITAL 74 - FER | Age: 88
LOS: 5 days | Discharge: HOME | DRG: 194 | End: 2023-12-13
Attending: STUDENT IN AN ORGANIZED HEALTH CARE EDUCATION/TRAINING PROGRAM | Admitting: HOSPITALIST
Payer: COMMERCIAL

## 2023-12-08 VITALS — BODY MASS INDEX: 32.5 KG/M2

## 2023-12-08 DIAGNOSIS — I13.0: ICD-10-CM

## 2023-12-08 DIAGNOSIS — F03.90: ICD-10-CM

## 2023-12-08 DIAGNOSIS — R09.02: ICD-10-CM

## 2023-12-08 DIAGNOSIS — N18.9: ICD-10-CM

## 2023-12-08 DIAGNOSIS — E78.5: ICD-10-CM

## 2023-12-08 DIAGNOSIS — E11.22: ICD-10-CM

## 2023-12-08 DIAGNOSIS — G20.A1: ICD-10-CM

## 2023-12-08 DIAGNOSIS — J12.1: Primary | ICD-10-CM

## 2023-12-08 DIAGNOSIS — I50.30: ICD-10-CM

## 2023-12-08 LAB
ALBUMIN SERPL-MCNC: 3.8 G/DL (ref 3.4–5)
ALP SERPL-CCNC: 92 U/L (ref 45–117)
ALT SERPL-CCNC: 8 U/L (ref 7–52)
ANION GAP SERPL CALC-SCNC: 10 MMOL/L (ref 4–13)
AST SERPL-CCNC: 43 U/L (ref 15–37)
BASE EXCESS BLDV CALC-SCNC: 0.4 MMOL/L (ref -2–2)
BILIRUB SERPL-MCNC: 0.5 MG/DL (ref 0.2–1)
BUN SERPL-MCNC: 30 MG/DL (ref 7–18)
CALCIUM SERPL-MCNC: 9 MG/DL (ref 8.5–10.1)
CHLORIDE SERPL-SCNC: 96 MMOL/L (ref 98–107)
CO2 SERPL-SCNC: 29 MMOL/L (ref 21–32)
CREAT SERPL-MCNC: 1.5 MG/DL (ref 0.6–1.3)
DEPRECATED RDW RBC AUTO: 15.7 % (ref 11.6–15.6)
GLUCOSE SERPL-MCNC: 198 MG/DL (ref 74–106)
HCT VFR BLD CALC: 46.4 % (ref 32.4–45.2)
HCT VFR BLDV CALC: 43 % (ref 32.4–45.2)
HGB BLD-MCNC: 14.4 G/DL (ref 10.7–15.3)
MAGNESIUM SERPL-MCNC: 1.9 MG/DL (ref 1.8–2.4)
MCH RBC QN AUTO: 28.5 PG (ref 25.7–33.7)
MCHC RBC AUTO-ENTMCNC: 31 G/DL (ref 32–36)
MCV RBC: 91.8 FL (ref 80–96)
PCO2 BLDV: 53.6 MMHG (ref 38–52)
PH BLDV: 7.33 [PH] (ref 7.31–7.41)
PLATELET # BLD AUTO: 191 10^3/UL (ref 134–434)
PLATELET BLD QL SMEAR: ADEQUATE
PMV BLD: 10.5 FL (ref 7.5–11.1)
POTASSIUM SERPLBLD-SCNC: 4.5 MMOL/L (ref 3.5–5.1)
PROT SERPL-MCNC: 6.5 G/DL (ref 6.4–8.2)
RBC # BLD AUTO: 5.05 10^6/UL (ref 3.6–5.2)
SODIUM SERPL-SCNC: 135 MMOL/L (ref 136–145)
WBC # BLD AUTO: 18.9 10^3/UL (ref 4–10.8)

## 2023-12-08 RX ADMIN — IPRATROPIUM BROMIDE AND ALBUTEROL SULFATE SCH AMP: .5; 3 SOLUTION RESPIRATORY (INHALATION) at 18:45

## 2023-12-08 RX ADMIN — IPRATROPIUM BROMIDE AND ALBUTEROL SULFATE SCH AMP: .5; 3 SOLUTION RESPIRATORY (INHALATION) at 19:00

## 2023-12-08 RX ADMIN — IPRATROPIUM BROMIDE AND ALBUTEROL SULFATE SCH AMP: .5; 3 SOLUTION RESPIRATORY (INHALATION) at 19:15

## 2023-12-08 RX ADMIN — IPRATROPIUM BROMIDE AND ALBUTEROL SULFATE SCH AMP: .5; 3 SOLUTION RESPIRATORY (INHALATION) at 19:30

## 2023-12-09 LAB
DEPRECATED RDW RBC AUTO: 15.2 % (ref 11.6–15.6)
HCT VFR BLD CALC: 41.5 % (ref 32.4–45.2)
HGB BLD-MCNC: 13.4 G/DL (ref 10.7–15.3)
MCH RBC QN AUTO: 29.7 PG (ref 25.7–33.7)
MCHC RBC AUTO-ENTMCNC: 32.3 G/DL (ref 32–36)
MCV RBC: 91.9 FL (ref 80–96)
PLATELET # BLD AUTO: 90.1 10^3/UL (ref 134–434)
PMV BLD: 11.2 FL (ref 7.5–11.1)
RBC # BLD AUTO: 4.52 10^6/UL (ref 3.6–5.2)
WBC # BLD AUTO: 17.6 10^3/UL (ref 4–10.8)

## 2023-12-09 RX ADMIN — METHYLPREDNISOLONE SODIUM SUCCINATE SCH: 40 INJECTION, POWDER, FOR SOLUTION INTRAMUSCULAR; INTRAVENOUS at 07:19

## 2023-12-09 RX ADMIN — LEVOTHYROXINE SODIUM SCH MCG: 75 TABLET ORAL at 06:47

## 2023-12-09 RX ADMIN — METHYLPREDNISOLONE SODIUM SUCCINATE SCH MG: 40 INJECTION, POWDER, FOR SOLUTION INTRAMUSCULAR; INTRAVENOUS at 18:10

## 2023-12-09 RX ADMIN — BUDESONIDE AND FORMOTEROL FUMARATE DIHYDRATE SCH PUFF: 160; 4.5 AEROSOL RESPIRATORY (INHALATION) at 21:30

## 2023-12-09 RX ADMIN — INSULIN ASPART SCH UNITS: 100 INJECTION, SOLUTION INTRAVENOUS; SUBCUTANEOUS at 11:46

## 2023-12-09 RX ADMIN — ACETAMINOPHEN PRN MG: 325 TABLET ORAL at 22:24

## 2023-12-09 RX ADMIN — ALBUTEROL SULFATE PRN AMP: 2.5 SOLUTION RESPIRATORY (INHALATION) at 22:28

## 2023-12-09 RX ADMIN — OXYCODONE HYDROCHLORIDE AND ACETAMINOPHEN SCH MG: 500 TABLET ORAL at 09:38

## 2023-12-09 RX ADMIN — BUDESONIDE AND FORMOTEROL FUMARATE DIHYDRATE SCH PUFF: 160; 4.5 AEROSOL RESPIRATORY (INHALATION) at 09:41

## 2023-12-09 RX ADMIN — RIVASTIGMINE TARTRATE SCH MG: 1.5 CAPSULE ORAL at 11:47

## 2023-12-09 RX ADMIN — ATORVASTATIN CALCIUM SCH MG: 10 TABLET, FILM COATED ORAL at 22:25

## 2023-12-09 RX ADMIN — INSULIN ASPART SCH UNITS: 100 INJECTION, SOLUTION INTRAVENOUS; SUBCUTANEOUS at 23:26

## 2023-12-09 RX ADMIN — INSULIN ASPART SCH UNITS: 100 INJECTION, SOLUTION INTRAVENOUS; SUBCUTANEOUS at 16:39

## 2023-12-09 RX ADMIN — METHYLPREDNISOLONE SODIUM SUCCINATE SCH MG: 40 INJECTION, POWDER, FOR SOLUTION INTRAMUSCULAR; INTRAVENOUS at 04:45

## 2023-12-09 RX ADMIN — INSULIN ASPART SCH UNITS: 100 INJECTION, SOLUTION INTRAVENOUS; SUBCUTANEOUS at 06:57

## 2023-12-09 RX ADMIN — FLUTICASONE PROPIONATE SCH SPRAY: 50 SPRAY, METERED NASAL at 09:40

## 2023-12-09 RX ADMIN — ESCITALOPRAM OXALATE SCH MG: 10 TABLET, FILM COATED ORAL at 09:38

## 2023-12-09 RX ADMIN — RIVASTIGMINE TARTRATE SCH MG: 1.5 CAPSULE ORAL at 22:25

## 2023-12-09 RX ADMIN — AMLODIPINE BESYLATE SCH MG: 5 TABLET ORAL at 09:38

## 2023-12-09 RX ADMIN — VITAMIN D, TAB 1000IU (100/BT) SCH UNIT: 25 TAB at 09:38

## 2023-12-09 RX ADMIN — METHYLPREDNISOLONE SODIUM SUCCINATE SCH MG: 40 INJECTION, POWDER, FOR SOLUTION INTRAMUSCULAR; INTRAVENOUS at 09:34

## 2023-12-10 LAB
ALBUMIN SERPL-MCNC: 4 G/DL (ref 3.4–5)
ALP SERPL-CCNC: 97 U/L (ref 45–117)
ALT SERPL-CCNC: 8 U/L (ref 7–52)
ANION GAP SERPL CALC-SCNC: 13 MMOL/L (ref 4–13)
ANISOCYTOSIS BLD QL: 0
AST SERPL-CCNC: 25 U/L (ref 15–37)
BASO STIPL BLD QL SMEAR: 0
BILIRUB SERPL-MCNC: 0.3 MG/DL (ref 0.2–1)
BUN SERPL-MCNC: 47 MG/DL (ref 7–18)
CALCIUM SERPL-MCNC: 9.6 MG/DL (ref 8.5–10.1)
CHLORIDE SERPL-SCNC: 100 MMOL/L (ref 98–107)
CO2 SERPL-SCNC: 30 MMOL/L (ref 21–32)
CREAT SERPL-MCNC: 1.5 MG/DL (ref 0.6–1.3)
DACRYOCYTES BLD QL SMEAR: 0
DEPRECATED RDW RBC AUTO: 14.9 % (ref 11.6–15.6)
DOHLE BOD BLD QL SMEAR: 0
GLUCOSE SERPL-MCNC: 260 MG/DL (ref 74–106)
HCT VFR BLD CALC: 42.1 % (ref 32.4–45.2)
HELMET CELLS BLD QL SMEAR: 0
HGB BLD-MCNC: 13.8 GM/DL (ref 10.7–15.3)
HOWELL-JOLLY BOD BLD QL SMEAR: 0
MACROCYTES BLD QL: 0
MCH RBC QN AUTO: 29.3 PG (ref 25.7–33.7)
MCHC RBC AUTO-ENTMCNC: 32.7 G/DL (ref 32–36)
MCV RBC: 89.8 FL (ref 80–96)
OVALOCYTES BLD QL SMEAR: 0
PHOSPHATE SERPL-MCNC: 4.7 MG/DL (ref 2.5–4.9)
PLATELET # BLD AUTO: 237 10^3/UL (ref 134–434)
PMV BLD: 10.9 FL (ref 7.5–11.1)
POTASSIUM SERPLBLD-SCNC: 4.6 MMOL/L (ref 3.5–5.1)
PROT SERPL-MCNC: 7 G/DL (ref 6.4–8.2)
RBC # BLD AUTO: 4.69 M/MM3 (ref 3.6–5.2)
ROULEAUX BLD QL SMEAR: 0
SICKLE CELLS BLD QL SMEAR: 0
SODIUM SERPL-SCNC: 143 MMOL/L (ref 136–145)
TARGETS BLD QL SMEAR: 0
TOXIC GRANULES BLD QL SMEAR: 0
WBC # BLD AUTO: 17.7 K/MM3 (ref 4–10)

## 2023-12-10 RX ADMIN — METHYLPREDNISOLONE SODIUM SUCCINATE SCH MG: 40 INJECTION, POWDER, FOR SOLUTION INTRAMUSCULAR; INTRAVENOUS at 02:52

## 2023-12-10 RX ADMIN — ACETAMINOPHEN PRN MG: 325 TABLET ORAL at 07:09

## 2023-12-10 RX ADMIN — ESCITALOPRAM OXALATE SCH MG: 10 TABLET, FILM COATED ORAL at 10:05

## 2023-12-10 RX ADMIN — ATORVASTATIN CALCIUM SCH MG: 10 TABLET, FILM COATED ORAL at 21:57

## 2023-12-10 RX ADMIN — INSULIN ASPART SCH UNITS: 100 INJECTION, SOLUTION INTRAVENOUS; SUBCUTANEOUS at 21:57

## 2023-12-10 RX ADMIN — RIVASTIGMINE TARTRATE SCH MG: 1.5 CAPSULE ORAL at 21:57

## 2023-12-10 RX ADMIN — VITAMIN D, TAB 1000IU (100/BT) SCH UNIT: 25 TAB at 10:04

## 2023-12-10 RX ADMIN — LEVOTHYROXINE SODIUM SCH MCG: 75 TABLET ORAL at 07:09

## 2023-12-10 RX ADMIN — METHYLPREDNISOLONE SODIUM SUCCINATE SCH MG: 40 INJECTION, POWDER, FOR SOLUTION INTRAMUSCULAR; INTRAVENOUS at 17:43

## 2023-12-10 RX ADMIN — INSULIN ASPART SCH UNITS: 100 INJECTION, SOLUTION INTRAVENOUS; SUBCUTANEOUS at 12:17

## 2023-12-10 RX ADMIN — FLUTICASONE PROPIONATE SCH SPRAY: 50 SPRAY, METERED NASAL at 10:08

## 2023-12-10 RX ADMIN — INSULIN ASPART SCH UNITS: 100 INJECTION, SOLUTION INTRAVENOUS; SUBCUTANEOUS at 16:59

## 2023-12-10 RX ADMIN — METHYLPREDNISOLONE SODIUM SUCCINATE SCH MG: 40 INJECTION, POWDER, FOR SOLUTION INTRAMUSCULAR; INTRAVENOUS at 10:05

## 2023-12-10 RX ADMIN — AMLODIPINE BESYLATE SCH MG: 5 TABLET ORAL at 10:05

## 2023-12-10 RX ADMIN — INSULIN ASPART SCH UNITS: 100 INJECTION, SOLUTION INTRAVENOUS; SUBCUTANEOUS at 07:09

## 2023-12-10 RX ADMIN — OXYCODONE HYDROCHLORIDE AND ACETAMINOPHEN SCH MG: 500 TABLET ORAL at 10:04

## 2023-12-10 RX ADMIN — FLUTICASONE FUROATE, UMECLIDINIUM BROMIDE AND VILANTEROL TRIFENATATE SCH PUFF: 200; 62.5; 25 POWDER RESPIRATORY (INHALATION) at 12:18

## 2023-12-10 RX ADMIN — RIVASTIGMINE TARTRATE SCH MG: 1.5 CAPSULE ORAL at 10:07

## 2023-12-11 RX ADMIN — LEVOTHYROXINE SODIUM SCH MCG: 75 TABLET ORAL at 06:49

## 2023-12-11 RX ADMIN — VITAMIN D, TAB 1000IU (100/BT) SCH UNIT: 25 TAB at 09:53

## 2023-12-11 RX ADMIN — FLUTICASONE FUROATE, UMECLIDINIUM BROMIDE AND VILANTEROL TRIFENATATE SCH PUFF: 200; 62.5; 25 POWDER RESPIRATORY (INHALATION) at 09:57

## 2023-12-11 RX ADMIN — HEPARIN SODIUM SCH UNIT: 5000 INJECTION, SOLUTION INTRAVENOUS; SUBCUTANEOUS at 21:14

## 2023-12-11 RX ADMIN — METHYLPREDNISOLONE SODIUM SUCCINATE SCH MG: 40 INJECTION, POWDER, FOR SOLUTION INTRAMUSCULAR; INTRAVENOUS at 01:50

## 2023-12-11 RX ADMIN — INSULIN ASPART SCH UNITS: 100 INJECTION, SOLUTION INTRAVENOUS; SUBCUTANEOUS at 17:33

## 2023-12-11 RX ADMIN — RIVASTIGMINE TARTRATE SCH MG: 1.5 CAPSULE ORAL at 09:53

## 2023-12-11 RX ADMIN — INSULIN ASPART SCH UNITS: 100 INJECTION, SOLUTION INTRAVENOUS; SUBCUTANEOUS at 06:48

## 2023-12-11 RX ADMIN — FLUTICASONE PROPIONATE SCH SPRAY: 50 SPRAY, METERED NASAL at 09:57

## 2023-12-11 RX ADMIN — ATORVASTATIN CALCIUM SCH MG: 10 TABLET, FILM COATED ORAL at 21:14

## 2023-12-11 RX ADMIN — ESCITALOPRAM OXALATE SCH MG: 10 TABLET, FILM COATED ORAL at 09:53

## 2023-12-11 RX ADMIN — INSULIN ASPART SCH UNITS: 100 INJECTION, SOLUTION INTRAVENOUS; SUBCUTANEOUS at 12:06

## 2023-12-11 RX ADMIN — RIVASTIGMINE TARTRATE SCH MG: 1.5 CAPSULE ORAL at 21:14

## 2023-12-11 RX ADMIN — METHYLPREDNISOLONE SODIUM SUCCINATE SCH MG: 40 INJECTION, POWDER, FOR SOLUTION INTRAMUSCULAR; INTRAVENOUS at 09:53

## 2023-12-11 RX ADMIN — METHYLPREDNISOLONE SODIUM SUCCINATE SCH MG: 40 INJECTION, POWDER, FOR SOLUTION INTRAMUSCULAR; INTRAVENOUS at 17:33

## 2023-12-11 RX ADMIN — OXYCODONE HYDROCHLORIDE AND ACETAMINOPHEN SCH MG: 500 TABLET ORAL at 09:53

## 2023-12-11 RX ADMIN — ALBUTEROL SULFATE PRN AMP: 2.5 SOLUTION RESPIRATORY (INHALATION) at 17:33

## 2023-12-11 RX ADMIN — INSULIN ASPART SCH UNITS: 100 INJECTION, SOLUTION INTRAVENOUS; SUBCUTANEOUS at 21:14

## 2023-12-11 RX ADMIN — HEPARIN SODIUM SCH UNIT: 5000 INJECTION, SOLUTION INTRAVENOUS; SUBCUTANEOUS at 13:50

## 2023-12-11 RX ADMIN — AMLODIPINE BESYLATE SCH MG: 5 TABLET ORAL at 09:52

## 2023-12-12 LAB
ALBUMIN SERPL-MCNC: 3.6 G/DL (ref 3.4–5)
ALP SERPL-CCNC: 80 U/L (ref 45–117)
ALT SERPL-CCNC: 9 U/L (ref 7–52)
ANION GAP SERPL CALC-SCNC: 7 MMOL/L (ref 4–13)
ANISOCYTOSIS BLD QL: (no result)
AST SERPL-CCNC: 14 U/L (ref 15–37)
BILIRUB SERPL-MCNC: 0.4 MG/DL (ref 0.2–1)
BUN SERPL-MCNC: 47 MG/DL (ref 7–18)
CALCIUM SERPL-MCNC: 8.9 MG/DL (ref 8.5–10.1)
CHLORIDE SERPL-SCNC: 101 MMOL/L (ref 98–107)
CO2 SERPL-SCNC: 33 MMOL/L (ref 21–32)
CREAT SERPL-MCNC: 1.4 MG/DL (ref 0.6–1.3)
DEPRECATED RDW RBC AUTO: 14.5 % (ref 11.6–15.6)
GLUCOSE SERPL-MCNC: 285 MG/DL (ref 74–106)
HCT VFR BLD CALC: 41 % (ref 32.4–45.2)
HGB BLD-MCNC: 13.3 GM/DL (ref 10.7–15.3)
MACROCYTES BLD QL: 0
MCH RBC QN AUTO: 29.2 PG (ref 25.7–33.7)
MCHC RBC AUTO-ENTMCNC: 32.4 G/DL (ref 32–36)
MCV RBC: 90.2 FL (ref 80–96)
PLATELET # BLD AUTO: 229 10^3/UL (ref 134–434)
PMV BLD: 9.4 FL (ref 7.5–11.1)
POTASSIUM SERPLBLD-SCNC: 4.7 MMOL/L (ref 3.5–5.1)
PROT SERPL-MCNC: 6 G/DL (ref 6.4–8.2)
RBC # BLD AUTO: 4.54 M/MM3 (ref 3.6–5.2)
SODIUM SERPL-SCNC: 141 MMOL/L (ref 136–145)
WBC # BLD AUTO: 12.8 K/MM3 (ref 4–10)

## 2023-12-12 RX ADMIN — PREDNISONE SCH MG: 20 TABLET ORAL at 09:38

## 2023-12-12 RX ADMIN — PREDNISONE SCH MG: 20 TABLET ORAL at 21:09

## 2023-12-12 RX ADMIN — INSULIN ASPART SCH UNITS: 100 INJECTION, SOLUTION INTRAVENOUS; SUBCUTANEOUS at 21:03

## 2023-12-12 RX ADMIN — LEVOTHYROXINE SODIUM SCH MCG: 75 TABLET ORAL at 06:29

## 2023-12-12 RX ADMIN — AMLODIPINE BESYLATE SCH: 5 TABLET ORAL at 09:45

## 2023-12-12 RX ADMIN — METHYLPREDNISOLONE SODIUM SUCCINATE SCH MG: 40 INJECTION, POWDER, FOR SOLUTION INTRAMUSCULAR; INTRAVENOUS at 01:40

## 2023-12-12 RX ADMIN — OXYCODONE HYDROCHLORIDE AND ACETAMINOPHEN SCH MG: 500 TABLET ORAL at 09:38

## 2023-12-12 RX ADMIN — INSULIN ASPART SCH UNITS: 100 INJECTION, SOLUTION INTRAVENOUS; SUBCUTANEOUS at 17:26

## 2023-12-12 RX ADMIN — ACETAMINOPHEN PRN MG: 325 TABLET ORAL at 21:10

## 2023-12-12 RX ADMIN — HEPARIN SODIUM SCH UNIT: 5000 INJECTION, SOLUTION INTRAVENOUS; SUBCUTANEOUS at 14:39

## 2023-12-12 RX ADMIN — RIVASTIGMINE TARTRATE SCH MG: 1.5 CAPSULE ORAL at 21:09

## 2023-12-12 RX ADMIN — VITAMIN D, TAB 1000IU (100/BT) SCH UNIT: 25 TAB at 09:38

## 2023-12-12 RX ADMIN — ESCITALOPRAM OXALATE SCH MG: 10 TABLET, FILM COATED ORAL at 09:38

## 2023-12-12 RX ADMIN — HEPARIN SODIUM SCH UNIT: 5000 INJECTION, SOLUTION INTRAVENOUS; SUBCUTANEOUS at 21:10

## 2023-12-12 RX ADMIN — ATORVASTATIN CALCIUM SCH MG: 10 TABLET, FILM COATED ORAL at 21:09

## 2023-12-12 RX ADMIN — INSULIN ASPART SCH UNITS: 100 INJECTION, SOLUTION INTRAVENOUS; SUBCUTANEOUS at 06:29

## 2023-12-12 RX ADMIN — FLUTICASONE PROPIONATE SCH SPRAY: 50 SPRAY, METERED NASAL at 14:34

## 2023-12-12 RX ADMIN — IPRATROPIUM BROMIDE AND ALBUTEROL SULFATE SCH AMP: .5; 3 SOLUTION RESPIRATORY (INHALATION) at 15:29

## 2023-12-12 RX ADMIN — HEPARIN SODIUM SCH UNIT: 5000 INJECTION, SOLUTION INTRAVENOUS; SUBCUTANEOUS at 06:29

## 2023-12-12 RX ADMIN — RIVASTIGMINE TARTRATE SCH MG: 1.5 CAPSULE ORAL at 09:38

## 2023-12-12 RX ADMIN — INSULIN ASPART SCH UNITS: 100 INJECTION, SOLUTION INTRAVENOUS; SUBCUTANEOUS at 11:34

## 2023-12-12 RX ADMIN — FLUTICASONE FUROATE, UMECLIDINIUM BROMIDE AND VILANTEROL TRIFENATATE SCH PUFF: 200; 62.5; 25 POWDER RESPIRATORY (INHALATION) at 14:33

## 2023-12-12 RX ADMIN — IPRATROPIUM BROMIDE AND ALBUTEROL SULFATE SCH AMP: .5; 3 SOLUTION RESPIRATORY (INHALATION) at 21:10

## 2023-12-12 RX ADMIN — ALBUTEROL SULFATE PRN AMP: 2.5 SOLUTION RESPIRATORY (INHALATION) at 08:30

## 2023-12-13 VITALS
HEART RATE: 69 BPM | SYSTOLIC BLOOD PRESSURE: 160 MMHG | DIASTOLIC BLOOD PRESSURE: 69 MMHG | TEMPERATURE: 98.1 F | RESPIRATION RATE: 18 BRPM

## 2023-12-13 LAB
ALBUMIN SERPL-MCNC: 3.6 G/DL (ref 3.4–5)
ALP SERPL-CCNC: 72 U/L (ref 45–117)
ALT SERPL-CCNC: 9 U/L (ref 7–52)
ANION GAP SERPL CALC-SCNC: 9 MMOL/L (ref 4–13)
AST SERPL-CCNC: 15 U/L (ref 15–37)
BILIRUB SERPL-MCNC: 0.4 MG/DL (ref 0.2–1)
BUN SERPL-MCNC: 47 MG/DL (ref 7–18)
CALCIUM SERPL-MCNC: 9 MG/DL (ref 8.5–10.1)
CHLORIDE SERPL-SCNC: 102 MMOL/L (ref 98–107)
CO2 SERPL-SCNC: 33 MMOL/L (ref 21–32)
CREAT SERPL-MCNC: 1.2 MG/DL (ref 0.6–1.3)
DEPRECATED RDW RBC AUTO: 15 % (ref 11.6–15.6)
GLUCOSE SERPL-MCNC: 245 MG/DL (ref 74–106)
HCT VFR BLD CALC: 42.2 % (ref 32.4–45.2)
HGB BLD-MCNC: 13.4 G/DL (ref 10.7–15.3)
MAGNESIUM SERPL-MCNC: 1.9 MG/DL (ref 1.8–2.4)
MCH RBC QN AUTO: 29 PG (ref 25.7–33.7)
MCHC RBC AUTO-ENTMCNC: 31.6 G/DL (ref 32–36)
MCV RBC: 91.7 FL (ref 80–96)
PHOSPHATE SERPL-MCNC: 4.3 MG/DL (ref 2.5–4.9)
PLATELET # BLD AUTO: 254.3 10^3/UL (ref 134–434)
PMV BLD: 9.6 FL (ref 7.5–11.1)
POTASSIUM SERPLBLD-SCNC: 4.4 MMOL/L (ref 3.5–5.1)
PROT SERPL-MCNC: 5.9 G/DL (ref 6.4–8.2)
RBC # BLD AUTO: 4.6 10^6/UL (ref 3.6–5.2)
SODIUM SERPL-SCNC: 144 MMOL/L (ref 136–145)
WBC # BLD AUTO: 13.7 10^3/UL (ref 4–10.8)

## 2023-12-13 RX ADMIN — INSULIN ASPART SCH UNITS: 100 INJECTION, SOLUTION INTRAVENOUS; SUBCUTANEOUS at 18:26

## 2023-12-13 RX ADMIN — HEPARIN SODIUM SCH UNIT: 5000 INJECTION, SOLUTION INTRAVENOUS; SUBCUTANEOUS at 16:25

## 2023-12-13 RX ADMIN — INSULIN ASPART SCH UNITS: 100 INJECTION, SOLUTION INTRAVENOUS; SUBCUTANEOUS at 06:26

## 2023-12-13 RX ADMIN — AMLODIPINE BESYLATE SCH MG: 5 TABLET ORAL at 09:49

## 2023-12-13 RX ADMIN — OXYCODONE HYDROCHLORIDE AND ACETAMINOPHEN SCH MG: 500 TABLET ORAL at 09:50

## 2023-12-13 RX ADMIN — LEVOTHYROXINE SODIUM SCH MCG: 75 TABLET ORAL at 06:26

## 2023-12-13 RX ADMIN — INSULIN ASPART SCH UNITS: 100 INJECTION, SOLUTION INTRAVENOUS; SUBCUTANEOUS at 13:23

## 2023-12-13 RX ADMIN — IPRATROPIUM BROMIDE AND ALBUTEROL SULFATE SCH AMP: .5; 3 SOLUTION RESPIRATORY (INHALATION) at 13:23

## 2023-12-13 RX ADMIN — PREDNISONE SCH MG: 20 TABLET ORAL at 09:50

## 2023-12-13 RX ADMIN — FLUTICASONE PROPIONATE SCH SPRAY: 50 SPRAY, METERED NASAL at 09:53

## 2023-12-13 RX ADMIN — IPRATROPIUM BROMIDE AND ALBUTEROL SULFATE SCH AMP: .5; 3 SOLUTION RESPIRATORY (INHALATION) at 19:56

## 2023-12-13 RX ADMIN — ESCITALOPRAM OXALATE SCH MG: 10 TABLET, FILM COATED ORAL at 09:49

## 2023-12-13 RX ADMIN — FLUTICASONE FUROATE, UMECLIDINIUM BROMIDE AND VILANTEROL TRIFENATATE SCH PUFF: 200; 62.5; 25 POWDER RESPIRATORY (INHALATION) at 09:53

## 2023-12-13 RX ADMIN — IPRATROPIUM BROMIDE AND ALBUTEROL SULFATE SCH AMP: .5; 3 SOLUTION RESPIRATORY (INHALATION) at 09:48

## 2023-12-13 RX ADMIN — HEPARIN SODIUM SCH UNIT: 5000 INJECTION, SOLUTION INTRAVENOUS; SUBCUTANEOUS at 06:25

## 2023-12-13 RX ADMIN — IPRATROPIUM BROMIDE AND ALBUTEROL SULFATE SCH AMP: .5; 3 SOLUTION RESPIRATORY (INHALATION) at 16:24

## 2023-12-13 RX ADMIN — RIVASTIGMINE TARTRATE SCH MG: 1.5 CAPSULE ORAL at 09:49

## 2023-12-13 RX ADMIN — VITAMIN D, TAB 1000IU (100/BT) SCH UNIT: 25 TAB at 09:50

## 2024-01-29 ENCOUNTER — APPOINTMENT (OUTPATIENT)
Dept: ENDOCRINOLOGY | Facility: CLINIC | Age: 89
End: 2024-01-29
Payer: MEDICARE

## 2024-01-29 VITALS
SYSTOLIC BLOOD PRESSURE: 120 MMHG | BODY MASS INDEX: 32.44 KG/M2 | HEART RATE: 74 BPM | WEIGHT: 190 LBS | DIASTOLIC BLOOD PRESSURE: 70 MMHG | OXYGEN SATURATION: 96 % | HEIGHT: 64 IN

## 2024-01-29 PROCEDURE — 99214 OFFICE O/P EST MOD 30 MIN: CPT

## 2024-01-29 PROCEDURE — G2211 COMPLEX E/M VISIT ADD ON: CPT

## 2024-01-29 RX ORDER — BLOOD-GLUCOSE METER
W/DEVICE EACH MISCELLANEOUS
Qty: 1 | Refills: 0 | Status: ACTIVE | COMMUNITY
Start: 2024-01-29 | End: 1900-01-01

## 2024-01-29 NOTE — HISTORY OF PRESENT ILLNESS
[FreeTextEntry1] : Jan 29, 2024                 needs new OneTouch Ultra  [facility]  [place]  PCP:  Dr. Cy Rodriguez:  Dr. Vasu Fernandez          Neurology:  Dr. Ciro Jimenez  CC:   Diabetes    (11/9/21 Zoe A1c 8.4 % ,  ,gd;.  TSH 0.4), Vit D 27.7)            8/2022 A1c 8.9            (Hypothyroid)          (Parkinson's disease)  87 yo accompanied by her friend and caregiver, Sarah Naylor.   Patient has Parkinson's Disease for about 25 years and requires a wheelchair.  Medications include: Metformin 500 mg BID  repaglinide 2 mg TID AC glipizide ER 10 mg in PM  levothyroxine 75 mcg daily.    : : Constitutional:  Alert, well nourished, healthy appearance, no acute distress  Eyes:  No proptosis, no stare Thyroid: Pulmonary:  No respiratory distress, no accessory muscle use; normal rate and effort Cardiac:  Normal rate Vascular:  Endocrine:  No stigmata of Cushings Syndrome Musculoskeletal:  Normal gait, no involuntary movements Neurology:  No tremors Affect/Mood/Psych:  Oriented x 3; normal affect, normal insight/judgement, normal mood  . Impression:  Review of data show blood sugars under acceptable control. Plan:  same Rx. Needs new OneTouch Ultra ROV May  Sep 25, 2023   in person in wheelchair with Jorge    PCP:  Dr. Cy Rodriguez:  Dr. Vasu Fernandez          Neurology:  Dr. Ciro Jimenez  CC:   Diabetes    (11/9/21 Zoe A1c 8.4 % ,  ,gd;.  TSH 0.4), Vit D 27.7)            8/2022 A1c 8.9            (Hypothyroid)          (Parkinson's disease)  86 yo accompanied by her friend and caregiver, Sarah Naylor.   Patient has Parkinson's Disease for about 25 years and requires a wheelchair.  Medications include: Metformin 500 mg BID  repaglinide 2 mg TID AC glipizide ER 10 mg in PM  levothyroxine 75 mcg daily.    FBS are now running 136, 132 although after breakfast up to 237 (roll w/ butter)  : : Constitutional:  Alert, well nourished, healthy appearance, no acute distress  Eyes:  No proptosis, no stare Thyroid:  normal to palpation  Pulmonary:  No respiratory distress, no accessory muscle use; normal rate and effort Cardiac:  Normal rate Vascular:  Endocrine:  No stigmata of Cushings Syndrome Musculoskeletal:  Normal gait, no involuntary movements Neurology:  No tremors Affect/Mood/Psych:  Oriented x 3; normal affect, normal insight/judgement, normal mood  . Imp:    FBS are in good range with PM metformin and gllipizide ER. Spikes during day, despite the repaglinide, reflect relatively starchy meals Labs kindly provided by Dr. Ruby show A1c down to 8.2%  Impression: ACP guidelines for her age accept A1c up to 8%, so she is doing well.  Plan:  Same Rx for now and ROV in a few months.    Annual eye exam.     May 22, 2023    in person with Nu  PCP:  Dr. Cy Ruby   July 27, 2022           Pulm:  Dr. Vasu Fernandez          Neurology:  Dr. Ciro Jimenez  CC:   Diabetes    (11/9/21 Zoe A1c 8.4 % ,  ,gd;.  TSH 0.4), Vit D 27.7)            8/2022 A1c 8.9            (Hypothyroid)          (Parkinson's disease)  86 yo accompanied by her friend and caregiver, Sarah Biswas and Luis Alfredo.   Patient has Parkinson's Disease for about 25 years and requires a wheelchair.  Medications include: Metformin 500 mg BID  repaglinide 2 mg TID AC  levothyroxine 75 mcg daily.     : : Constitutional:  Alert, well nourished, healthy appearance, no acute distress  Eyes:  No proptosis, no stare Thyroid: Pulmonary:  No respiratory distress, no accessory muscle use; normal rate and effort Cardiac:  Normal rate Vascular:  Endocrine:  No stigmata of Cushing's Syndrome Musculoskeletal:  Normal gait, no involuntary movements Neurology:  No tremors Affect/Mood/Psych:  Oriented x 3; normal affect, normal insight/judgement, normal mood  .  Imp:  Luis Alfredo reports FBS around 180. Plan:  Add glipizide ER 2.5 mg after evening meal to improve FBS. ROV by January.   Luis Alfredo will bring records to visit.      Dec 19, 2022     in person accompanied by Sarah Ruiz  (p )  PCP:  Dr. Cy Ruby   July 27, 2022           Pulm:  Dr. Vasu Fernandez          Neurology:  Dr. Ciro Jimenez  CC:   Diabetes    (11/9/21 Zoe A1c 8.4 % ,  ,gd;.  TSH 0.4), Vit D 27.7)            8/2022 A1c 8.9            (Hypothyroid)          (Parkinson's disease)  86 yo accompanied by her friend and caregiver, Sarah Biswas and Luis Alfredo.   Patient has Parkinson's Disease for about 25 years and requires a wheelchair.  Medications include: Metformin 500 mg BID  re;aglinide 1 mg BID AC   levothyroxine 75 mcg daily.    . atrovastatin 10 mg lisinopril 30 mg amlodipine 5 mg carbidopa/levodopa lexapro albuterol Anoro Ellipta inhaler Exelone 1.5 mg BID    : : Constitutional:  Alert, well nourished, healthy appearance, no acute distress  Eyes:  No proptosis, no stare Thyroid: Pulmonary:  No respiratory distress, no accessory muscle use; normal rate and effort Cardiac:  Normal rate Vascular:  Endocrine:  No stigmata of Cushing's Syndrome Musculoskeletal:  Normal gait, no involuntary movements Neurology:  No tremors Affect/Mood/Psych:  Oriented x 3; normal affect, normal insight/judgement, normal mood  .  Impression:  FBS runs 150-170 mg;/dl, so will add glipizide ER 2.5 mg in PM if still high by next visit in May.   After meals is also routinely elevated, so will increase the repaglinide to 2 mg AC meals. ROV by May 2023.     Aug 22, 2022       in person with Luis Alfredo and Sarah Biswas     PCP:  Dr. Cy Ruby   July 27, 2022           Pulm:  Dr. Vasu Fernandez          Neurology:  Dr. Ciro Jimenez  CC:   Diabetes    (11/9/21 Zoe A1c 8.4 % ,  ,gd;.  TSH 0.4), Vit D 27.7)          (Hypothyroid)          (Parkinson's disease)  87 yo accompanied by her friend and caregiver, Sarah Biswas and Luis Alfredo.   Patient has Parkinson's Disease for about 25 years and requires a wheelchair.  Medications include Metformin 500 mg BID and levothyroxine 75 mcg daily.      : : Constitutional:  Alert, well nourished, healthy appearance, no acute distress  Eyes:  No proptosis, no stare Thyroid: Pulmonary:  No respiratory distress, no accessory muscle use; normal rate and effort Cardiac:  Normal rate Vascular:  Endocrine:  No stigmata of Cushing's Syndrome Musculoskeletal:  Normal gait, no involuntary movements Neurology:  No tremors Affect/Mood/Psych:  Oriented x 3; normal affect, normal insight/judgement, normal mood  .  Impression:  Hypothyroidism well controlled. 4/18/22 TSH 0.74 on LT4 75 mcg daily A1c on 4/18/22 and  suggests room for improvement.  Plan:  Gave Luis Alfredo another Verio and she said she will assist patient in monitoring sugars at least once a day: AC breakfast and AC dinner - alternating. Add Prandin 1 mg BID AC (repaglinide)  ROV by December or January. [facility].   A1c today    Apr 18, 2022      in person, accompanied by her friend and caregiver  PCP:  Dr. Cy Rodriguez:  Dr. Vasu Fernandez          Neurology:  Dr. Ciro Jimenez  CC:   Diabetes    (11/9/21 Zoe A1c 8.4 % ,  ,gd;.  TSH 0.4), Vit D 27.7)          (Hypothyroid)          (Partkinson's disease)  87 yo accompanied by her friend and caregiver, Sarah Biswas.   Patient has Parkinson's Disease for about 25 years and requires a wheelchair.  Since last visit, obtained a OneTouch Verio meter, but was having difficulty using it.  Verio shows BS today 222 m/gdl.   : : Constitutional:  Alert, well nourished, healthy appearance, no acute distress  Eyes:  No proptosis, no stare Thyroid: Pulmonary:  No respiratory distress, no accessory muscle use; normal rate and effort Cardiac:  Normal rate Vascular:  Endocrine:  No stigmata of Cushing's Syndrome Musculoskeletal:  Normal gait, no involuntary movements Neurology:  No tremors Affect/Mood/Psych:  Oriented x 3; normal affect, normal insight/judgement, normal mood  .  Impression: Diabetes under loose control.  Plan:  She will start testing three meals a week. Bring results to next viisit in August. A1c today.       Feb 23, 2022        in wheelchair accompanied by friend and caregiver   Sarah Biswas (rel to [facility])    [phone]                                    as well as her home health aide   PCP:  Dr. Cy Rodriguez:  Dr. Vasu Fernandez          Neurology:  Dr. Ciro Jimenez  CC:   Diabetes    (11/9/21 Zoe A1c 8.4 % ,  ,gd;.  TSH 0.4), Vit D 27.7)          (Hypothyroid)          (Partkinson's disease)  87 yo accompanied by her friend and caregiver, Sarah Biswas.   Patient has Parkinson's Disease for about 25 years and requires a wheelchair.  She brings in a list of her medications which includes :  Metformin 500 mg BID Levothyroxine 75 mcg daily atrovastain 10 mg daily lisinopril 30 mg daily amlodipine 5 mg daily lexapro 10 mg daily albuterol anoro ellipta donepezil 5 mg in PM     A1c  8.4   Impression:  Blood sugar in mid 200's after breakfast.  Plan:  Reviewed ADA gudielines and strategies to achieve these. Asked her aide to assist in  monitoring sugars for 3 meals a week. ROV in 2 and 4 months.  Thank you.  -duong

## 2024-02-04 ENCOUNTER — RX RENEWAL (OUTPATIENT)
Age: 89
End: 2024-02-04

## 2024-02-21 ENCOUNTER — APPOINTMENT (OUTPATIENT)
Dept: PULMONOLOGY | Facility: CLINIC | Age: 89
End: 2024-02-21
Payer: MEDICARE

## 2024-02-21 VITALS
SYSTOLIC BLOOD PRESSURE: 130 MMHG | OXYGEN SATURATION: 89 % | BODY MASS INDEX: 32.44 KG/M2 | HEART RATE: 82 BPM | WEIGHT: 190 LBS | DIASTOLIC BLOOD PRESSURE: 60 MMHG | HEIGHT: 64 IN

## 2024-02-21 DIAGNOSIS — J44.9 CHRONIC OBSTRUCTIVE PULMONARY DISEASE, UNSPECIFIED: ICD-10-CM

## 2024-02-21 PROCEDURE — 99213 OFFICE O/P EST LOW 20 MIN: CPT

## 2024-02-21 NOTE — HISTORY OF PRESENT ILLNESS
[TextBox_4] : Patient with severe COPD, Parkinson's disease and dementia.  She has moderate hypoxemia.  Current O2 sat 90 on room air at rest.  There have been no exacerbations.  Her breathing is okay.  She has no cough or sputum production.  She went to the emergency room recently for a fall but there were no fractures.  She uses oxygen when walking but does not use it with sleep as recommended.  She has an aide 24-7.  Current O2 sat 90 on room air.  She looks quite comfortable.  She is in the wheelchair.

## 2024-02-21 NOTE — PHYSICAL EXAM
[No Acute Distress] : no acute distress [Normal Oropharynx] : normal oropharynx [Normal Appearance] : normal appearance [No Neck Mass] : no neck mass [Normal Rate/Rhythm] : normal rate/rhythm [Normal S1, S2] : normal s1, s2 [No Murmurs] : no murmurs [No Resp Distress] : no resp distress [No Abnormalities] : no abnormalities [Benign] : benign [No Clubbing] : no clubbing [No Cyanosis] : no cyanosis [No Edema] : no edema [FROM] : FROM [Normal Color/ Pigmentation] : normal color/ pigmentation [No Focal Deficits] : no focal deficits [Oriented x3] : oriented x3 [Normal Affect] : normal affect [TextBox_68] : decreased bs's [TextBox_99] : in wheelchair

## 2024-02-21 NOTE — ASSESSMENT
[FreeTextEntry1] : Patient with severe COPD clinically stable on Stiolto.  Patient is advised to use oxygen with ambulation and with sleep.  Current O2 sat 90 on room air at rest.  She is to follow-up with me in 6 months.

## 2024-02-22 RX ORDER — BLOOD SUGAR DIAGNOSTIC
STRIP MISCELLANEOUS DAILY
Qty: 50 | Refills: 6 | Status: ACTIVE | COMMUNITY
Start: 2022-02-23 | End: 1900-01-01

## 2024-05-20 ENCOUNTER — APPOINTMENT (OUTPATIENT)
Dept: ENDOCRINOLOGY | Facility: CLINIC | Age: 89
End: 2024-05-20
Payer: MEDICARE

## 2024-05-20 VITALS
SYSTOLIC BLOOD PRESSURE: 116 MMHG | HEIGHT: 64 IN | DIASTOLIC BLOOD PRESSURE: 80 MMHG | WEIGHT: 190 LBS | BODY MASS INDEX: 32.44 KG/M2 | OXYGEN SATURATION: 96 % | HEART RATE: 87 BPM

## 2024-05-20 DIAGNOSIS — E03.9 HYPOTHYROIDISM, UNSPECIFIED: ICD-10-CM

## 2024-05-20 DIAGNOSIS — E11.9 TYPE 2 DIABETES MELLITUS W/OUT COMPLICATIONS: ICD-10-CM

## 2024-05-20 DIAGNOSIS — E55.9 VITAMIN D DEFICIENCY, UNSPECIFIED: ICD-10-CM

## 2024-05-20 DIAGNOSIS — E53.8 DEFICIENCY OF OTHER SPECIFIED B GROUP VITAMINS: ICD-10-CM

## 2024-05-20 LAB
ANION GAP SERPL CALC-SCNC: 15 MMOL/L
BUN SERPL-MCNC: 19 MG/DL
CALCIUM SERPL-MCNC: 9.2 MG/DL
CHLORIDE SERPL-SCNC: 102 MMOL/L
CO2 SERPL-SCNC: 24 MMOL/L
CREAT SERPL-MCNC: 0.91 MG/DL
EGFR: 61 ML/MIN/1.73M2
ESTIMATED AVERAGE GLUCOSE: 174 MG/DL
GLUCOSE SERPL-MCNC: 193 MG/DL
HBA1C MFR BLD HPLC: 7.7 %
POTASSIUM SERPL-SCNC: 4.3 MMOL/L
SODIUM SERPL-SCNC: 140 MMOL/L

## 2024-05-20 PROCEDURE — 36415 COLL VENOUS BLD VENIPUNCTURE: CPT

## 2024-05-20 PROCEDURE — 99215 OFFICE O/P EST HI 40 MIN: CPT | Mod: 25

## 2024-05-20 RX ORDER — RIVASTIGMINE TARTRATE 6 MG/1
6 CAPSULE ORAL TWICE DAILY
Refills: 0 | Status: ACTIVE | COMMUNITY

## 2024-05-20 NOTE — HISTORY OF PRESENT ILLNESS
[FreeTextEntry1] : May 20, 2024     in person     PCP:  Dr. Cy Rodriguez:  Dr. Vasu Fernandez          Neurology:  Dr. Ciro Jimenez  CC:   Diabetes    (11/9/21 [place] A1c 8.4 % ,  ,gd;.  TSH 0.4), Vit D 27.7)            8/2022 A1c 8.9  1/2024  7.7            (Hypothyroid)          (Parkinson's disease)  89 yo accompanied by her friend and caregiver, Sarah Julzac and Luis Alfredo.   Patient has Parkinson's Disease for about 25 years and requires a wheelchair.  Medications include: Metformin 500 mg BID  repaglinide 2 mg TID AC glipizide ER 10 mg in PM  levothyroxine 75 mcg daily.    Today she brings in fingerstick blood sugars from before and after meals. Post prandial sugars rarely go over 180 mg/dl max.  : : Constitutional:  Alert, well nourished, healthy appearance, no acute distress  Eyes:  No proptosis, no stare Thyroid: Pulmonary:  No respiratory distress, no accessory muscle use; normal rate and effort Cardiac:  Normal rate Vascular:  Endocrine:  No stigmata of Cushings Syndrome Musculoskeletal:  Normal gait, no involuntary movements Neurology:  No tremors Affect/Mood/Psych:  Oriented x 3; normal affect, normal insight/judgement, normal mood  . Impression:   Diabetes appears to be under acceptable control on current Rx. Plan:   Updated labs today. Also see PCP. ROV by January.      Jan 29, 2024                 needs new OneTouch Ultra  [facility]  [place]  PCP:  Dr. Cy Rodriguez:  Dr. Vasu Fernandez          Neurology:  Dr. Ciro Jimenez  CC:   Diabetes    (11/9/21 [place] A1c 8.4 % ,  ,gd;.  TSH 0.4), Vit D 27.7)            8/2022 A1c 8.9            (Hypothyroid)          (Parkinson's disease)  89 yo accompanied by her friend and caregiver, Sarah MandujanoElizabeth.   Patient has Parkinson's Disease for about 25 years and requires a wheelchair.  Medications include: Metformin 500 mg BID  repaglinide 2 mg TID AC glipizide ER 10 mg in PM  levothyroxine 75 mcg daily.    : : Constitutional:  Alert, well nourished, healthy appearance, no acute distress  Eyes:  No proptosis, no stare Thyroid: Pulmonary:  No respiratory distress, no accessory muscle use; normal rate and effort Cardiac:  Normal rate Vascular:  Endocrine:  No stigmata of Cushings Syndrome Musculoskeletal:  Normal gait, no involuntary movements Neurology:  No tremors Affect/Mood/Psych:  Oriented x 3; normal affect, normal insight/judgement, normal mood  . Impression:  Review of data show blood sugars under acceptable control. Plan:  same Rx. Needs new OneTouch Ultra ROV May  Sep 25, 2023   in person in wheelchair with Jorge    PCP:  Dr. Cy Ruby            Pulm:  Dr. Vasu Fernandez          Neurology:  Dr. Ciro Jimenez  CC:   Diabetes    (11/9/21 [place] A1c 8.4 % ,  ,gd;.  TSH 0.4), Vit D 27.7)            8/2022 A1c 8.9            (Hypothyroid)          (Parkinson's disease)  86 yo accompanied by her friend and caregiver, Sarah Biswas and Luis Alfredo.   Patient has Parkinson's Disease for about 25 years and requires a wheelchair.  Medications include: Metformin 500 mg BID  repaglinide 2 mg TID AC glipizide ER 10 mg in PM  levothyroxine 75 mcg daily.    FBS are now running 136, 132 although after breakfast up to 237 (roll w/ butter)  : : Constitutional:  Alert, well nourished, healthy appearance, no acute distress  Eyes:  No proptosis, no stare Thyroid:  normal to palpation  Pulmonary:  No respiratory distress, no accessory muscle use; normal rate and effort Cardiac:  Normal rate Vascular:  Endocrine:  No stigmata of Cushings Syndrome Musculoskeletal:  Normal gait, no involuntary movements Neurology:  No tremors Affect/Mood/Psych:  Oriented x 3; normal affect, normal insight/judgement, normal mood  . Imp:    FBS are in good range with PM metformin and gllipizide ER. Spikes during day, despite the repaglinide, reflect relatively starchy meals Labs kindly provided by Dr. Ruby show A1c down to 8.2%  Impression: ACP guidelines for her age accept A1c up to 8%, so she is doing well.  Plan:  Same Rx for now and ROV in a few months.    Annual eye exam.     May 22, 2023    in person with Nu  PCP:  Dr. Cy Ruby   July 27, 2022           Pulm:  Dr. Vasu Fernandez          Neurology:  Dr. Ciro Jimenez  CC:   Diabetes    (11/9/21 [place] A1c 8.4 % ,  ,gd;.  TSH 0.4), Vit D 27.7)            8/2022 A1c 8.9            (Hypothyroid)          (Parkinson's disease)  86 yo accompanied by her friend and caregiver, Sarah Biswas and Luis Alfredo.   Patient has Parkinson's Disease for about 25 years and requires a wheelchair.  Medications include: Metformin 500 mg BID  repaglinide 2 mg TID AC  levothyroxine 75 mcg daily.     : : Constitutional:  Alert, well nourished, healthy appearance, no acute distress  Eyes:  No proptosis, no stare Thyroid: Pulmonary:  No respiratory distress, no accessory muscle use; normal rate and effort Cardiac:  Normal rate Vascular:  Endocrine:  No stigmata of Cushing's Syndrome Musculoskeletal:  Normal gait, no involuntary movements Neurology:  No tremors Affect/Mood/Psych:  Oriented x 3; normal affect, normal insight/judgement, normal mood  .  Imp:  Luis Alfredo reports FBS around 180. Plan:  Add glipizide ER 2.5 mg after evening meal to improve FBS. ROV by January.   Luis Alfredo will bring records to visit.      Dec 19, 2022     in person accompanied by Sarah Ruiz  (p )  PCP:  Dr. Cy Ruby   July 27, 2022           Pulm:  Dr. Vasu Fernandez          Neurology:  Dr. Ciro Jimenez  CC:   Diabetes    (11/9/21 [place] A1c 8.4 % ,  ,gd;.  TSH 0.4), Vit D 27.7)            8/2022 A1c 8.9            (Hypothyroid)          (Parkinson's disease)  86 yo accompanied by her friend and caregiver, Sarah Biswas and Luis Alfredo.   Patient has Parkinson's Disease for about 25 years and requires a wheelchair.  Medications include: Metformin 500 mg BID  re;aglinide 1 mg BID AC   levothyroxine 75 mcg daily.    . atrovastatin 10 mg lisinopril 30 mg amlodipine 5 mg carbidopa/levodopa lexapro albuterol Anoro Ellipta inhaler Exelone 1.5 mg BID    : : Constitutional:  Alert, well nourished, healthy appearance, no acute distress  Eyes:  No proptosis, no stare Thyroid: Pulmonary:  No respiratory distress, no accessory muscle use; normal rate and effort Cardiac:  Normal rate Vascular:  Endocrine:  No stigmata of Cushing's Syndrome Musculoskeletal:  Normal gait, no involuntary movements Neurology:  No tremors Affect/Mood/Psych:  Oriented x 3; normal affect, normal insight/judgement, normal mood  .  Impression:  FBS runs 150-170 mg;/dl, so will add glipizide ER 2.5 mg in PM if still high by next visit in May.   After meals is also routinely elevated, so will increase the repaglinide to 2 mg AC meals. ROV by May 2023.     Aug 22, 2022       in person with Nu Biswas     PCP:  Dr. Cy Ruby   July 27, 2022           Pulm:  Dr. Vasu Fernandez          Neurology:  Dr. Ciro Jimenez  CC:   Diabetes    (11/9/21 [place] A1c 8.4 % ,  ,gd;.  TSH 0.4), Vit D 27.7)          (Hypothyroid)          (Parkinson's disease)  85 yo accompanied by her friend and caregiver, Sarah Biswas and Luis Alfredo.   Patient has Parkinson's Disease for about 25 years and requires a wheelchair.  Medications include Metformin 500 mg BID and levothyroxine 75 mcg daily.      : : Constitutional:  Alert, well nourished, healthy appearance, no acute distress  Eyes:  No proptosis, no stare Thyroid: Pulmonary:  No respiratory distress, no accessory muscle use; normal rate and effort Cardiac:  Normal rate Vascular:  Endocrine:  No stigmata of Cushing's Syndrome Musculoskeletal:  Normal gait, no involuntary movements Neurology:  No tremors Affect/Mood/Psych:  Oriented x 3; normal affect, normal insight/judgement, normal mood  .  Impression:  Hypothyroidism well controlled. 4/18/22 TSH 0.74 on LT4 75 mcg daily A1c on 4/18/22 and  suggests room for improvement.  Plan:  Gave Luis Alfredo another Verio and she said she will assist patient in monitoring sugars at least once a day: AC breakfast and AC dinner - alternating. Add Prandin 1 mg BID AC (repaglinide)  ROV by December or January. All Rx to [facility] now.   A1c today    Apr 18, 2022      in person, accompanied by her friend and caregiver  PCP:  Dr. Cy Rodriguez:  Dr. Vasu Fernandez          Neurology:  Dr. Ciro Jimenez  CC:   Diabetes    (11/9/21 [place] A1c 8.4 % ,  ,gd;.  TSH 0.4), Vit D 27.7)          (Hypothyroid)          (Partkinson's disease)  85 yo accompanied by her friend and caregiver, Sarah Mandujanozac.   Patient has Parkinson's Disease for about 25 years and requires a wheelchair.  Since last visit, obtained a OneTouch Verio meter, but was having difficulty using it.  Verio shows BS today 222 m/gdl.   : : Constitutional:  Alert, well nourished, healthy appearance, no acute distress  Eyes:  No proptosis, no stare Thyroid: Pulmonary:  No respiratory distress, no accessory muscle use; normal rate and effort Cardiac:  Normal rate Vascular:  Endocrine:  No stigmata of Cushing's Syndrome Musculoskeletal:  Normal gait, no involuntary movements Neurology:  No tremors Affect/Mood/Psych:  Oriented x 3; normal affect, normal insight/judgement, normal mood  .  Impression: Diabetes under loose control.  Plan:  She will start testing three meals a week. Bring results to next viisit in August. A1c today.       Feb 23, 2022        in wheelchair accompanied by friend and caregiver   Sarah Biswas (rel to [facility])    [phone]                                    as well as her home health aide   PCP:  Dr. Cy Rodriguez:  Dr. Vasu Fernandez          Neurology:  Dr. Ciro Jimenez  CC:   Diabetes    (11/9/21 [place] A1c 8.4 % ,  ,gd;.  TSH 0.4), Vit D 27.7)          (Hypothyroid)          (Partkinson's disease)  85 yo accompanied by her friend and caregiver, Sarah Zulay.   Patient has Parkinson's Disease for about 25 years and requires a wheelchair.  She brings in a list of her medications which includes :  Metformin 500 mg BID Levothyroxine 75 mcg daily atrovastain 10 mg daily lisinopril 30 mg daily amlodipine 5 mg daily lexapro 10 mg daily albuterol anoro ellipta donepezil 5 mg in PM     A1c  8.4   Impression:  Blood sugar in mid 200's after breakfast.  Plan:  Reviewed ADA gudielines and strategies to achieve these. Asked her aide to assist in  monitoring sugars for 3 meals a week. ROV in 2 and 4 months.  Thank you.  -duong

## 2024-05-21 LAB
25(OH)D3 SERPL-MCNC: 47.3 NG/ML
ANION GAP SERPL CALC-SCNC: 17 MMOL/L
BUN SERPL-MCNC: 32 MG/DL
CALCIUM SERPL-MCNC: 9.6 MG/DL
CHLORIDE SERPL-SCNC: 101 MMOL/L
CO2 SERPL-SCNC: 23 MMOL/L
CREAT SERPL-MCNC: 1.05 MG/DL
EGFR: 52 ML/MIN/1.73M2
ESTIMATED AVERAGE GLUCOSE: 163 MG/DL
GLUCOSE SERPL-MCNC: 172 MG/DL
HBA1C MFR BLD HPLC: 7.3 %
POTASSIUM SERPL-SCNC: 4.6 MMOL/L
SODIUM SERPL-SCNC: 141 MMOL/L
T4 SERPL-MCNC: 8.2 UG/DL
TSH SERPL-ACNC: 1.09 UIU/ML
VIT B12 SERPL-MCNC: 724 PG/ML

## 2024-05-29 RX ORDER — REPAGLINIDE 2 MG/1
2 TABLET ORAL
Qty: 270 | Refills: 3 | Status: ACTIVE | COMMUNITY
Start: 2022-08-22 | End: 1900-01-01

## 2024-06-10 ENCOUNTER — RX RENEWAL (OUTPATIENT)
Age: 89
End: 2024-06-10

## 2024-06-10 RX ORDER — GLIPIZIDE 10 MG/1
10 TABLET, FILM COATED, EXTENDED RELEASE ORAL
Qty: 90 | Refills: 2 | Status: ACTIVE | COMMUNITY
Start: 2023-05-22 | End: 1900-01-01

## 2024-08-26 ENCOUNTER — APPOINTMENT (OUTPATIENT)
Dept: PULMONOLOGY | Facility: CLINIC | Age: 89
End: 2024-08-26
Payer: MEDICARE

## 2024-08-26 VITALS
SYSTOLIC BLOOD PRESSURE: 110 MMHG | WEIGHT: 190 LBS | HEIGHT: 64 IN | DIASTOLIC BLOOD PRESSURE: 70 MMHG | OXYGEN SATURATION: 87 % | BODY MASS INDEX: 32.44 KG/M2 | HEART RATE: 79 BPM

## 2024-08-26 DIAGNOSIS — J44.9 CHRONIC OBSTRUCTIVE PULMONARY DISEASE, UNSPECIFIED: ICD-10-CM

## 2024-08-26 PROCEDURE — 99213 OFFICE O/P EST LOW 20 MIN: CPT

## 2024-08-26 NOTE — PHYSICAL EXAM
[No Acute Distress] : no acute distress [Normal Oropharynx] : normal oropharynx [Normal Appearance] : normal appearance [No Neck Mass] : no neck mass [Normal Rate/Rhythm] : normal rate/rhythm [Normal S1, S2] : normal s1, s2 [No Murmurs] : no murmurs [No Resp Distress] : no resp distress [No Abnormalities] : no abnormalities [Benign] : benign [No Clubbing] : no clubbing [No Cyanosis] : no cyanosis [FROM] : FROM [1+ Pitting] : 1+ pitting [Normal Color/ Pigmentation] : normal color/ pigmentation [No Focal Deficits] : no focal deficits [Oriented x3] : oriented x3 [Normal Affect] : normal affect [TextBox_68] : decreased bs's [TextBox_99] : in wheelchair

## 2024-08-26 NOTE — ASSESSMENT
[FreeTextEntry1] : Patient with severe COPD clinically stable.  Current O2 sat 88 on room air at rest.  Patient is advised to use oxygen more during the day and must use it when she sleeps at night.  She seems aware of this and states she will do it.  She will follow-up with me in 6 months.

## 2024-08-26 NOTE — HISTORY OF PRESENT ILLNESS
[TextBox_4] : Patient with Parkinson's disease, dementia and severe COPD.  She has borderline hypoxemia.  Current O2 sat 88 on room air at rest in a wheelchair.  She uses oxygen when she walks at home but is not using it at sleep as recommended.  There have been no exacerbations.  No hospitalizations or ER visits.  She has an aide 24-7.  Her overall condition has been stable.  She is maintained on Stiolto 2 inhalation once a day.
Ears: no ear pain and no hearing problems. Nose: no nasal congestion and no nasal drainage. Mouth/Throat: no dysphagia, no hoarseness and no throat pain. Neck: no lumps, no pain, no stiffness and no swollen glands.

## 2024-09-23 ENCOUNTER — APPOINTMENT (OUTPATIENT)
Dept: ENDOCRINOLOGY | Facility: CLINIC | Age: 89
End: 2024-09-23
Payer: MEDICARE

## 2024-09-23 VITALS
WEIGHT: 190 LBS | DIASTOLIC BLOOD PRESSURE: 70 MMHG | BODY MASS INDEX: 32.44 KG/M2 | SYSTOLIC BLOOD PRESSURE: 122 MMHG | HEART RATE: 77 BPM | OXYGEN SATURATION: 97 % | HEIGHT: 64 IN

## 2024-09-23 DIAGNOSIS — E11.9 TYPE 2 DIABETES MELLITUS W/OUT COMPLICATIONS: ICD-10-CM

## 2024-09-23 DIAGNOSIS — E03.9 HYPOTHYROIDISM, UNSPECIFIED: ICD-10-CM

## 2024-09-23 LAB
ANION GAP SERPL CALC-SCNC: 11 MMOL/L
BUN SERPL-MCNC: 20 MG/DL
CALCIUM SERPL-MCNC: 9.6 MG/DL
CHLORIDE SERPL-SCNC: 100 MMOL/L
CO2 SERPL-SCNC: 31 MMOL/L
CREAT SERPL-MCNC: 0.84 MG/DL
EGFR: 68 ML/MIN/1.73M2
GLUCOSE SERPL-MCNC: 199 MG/DL
POTASSIUM SERPL-SCNC: 4.8 MMOL/L
SODIUM SERPL-SCNC: 142 MMOL/L
TSH SERPL-ACNC: 0.98 UIU/ML
VIT B12 SERPL-MCNC: 731 PG/ML

## 2024-09-23 PROCEDURE — 36415 COLL VENOUS BLD VENIPUNCTURE: CPT

## 2024-09-23 PROCEDURE — 99215 OFFICE O/P EST HI 40 MIN: CPT | Mod: 25

## 2024-09-23 NOTE — HISTORY OF PRESENT ILLNESS
[FreeTextEntry1] : Sep 23, 2024    in person accompanied by  Sarah Ruiz (who's son I treated for DM1).  PCP:  Dr. Cy Rodriguez:  Dr. Vasu Fernandez          Neurology:  Dr. Ciro Jimenez  CC:   Diabetes    (11/9/21 [place] A1c 8.4 % ,  ,gd;.  TSH 0.4), Vit D 27.7)            8/2022 A1c 8.9  1/2024  7.7            (Hypothyroid)          (Parkinson's disease)  87 yo accompanied by her friend and caregiver, Sarah MandujanoElizabeth.   Patient has Parkinson's Disease for about 25 years and requires a wheelchair.  Medications include: Metformin 500 mg BID  repaglinide 2 mg TID AC glipizide ER 10 mg in PM  levothyroxine 75 mcg daily.    5/2024 A1c 7.3%; B12 724;   vit D 47;     - TSH 1.09    [facility] No eppisodes of hypoglycemia.  : : Constitutional:  Alert, well nourished, healthy appearance, no acute distress  Eyes:  No proptosis, no stare Thyroid: Pulmonary:  No respiratory distress, no accessory muscle use; normal rate and effort Cardiac:  Normal rate Vascular:  Endocrine:  No stigmata of Cushings Syndrome Musculoskeletal:  Normal gait, no involuntary movements Neurology:  No tremors Affect/Mood/Psych:  Oriented x 3; normal affect, normal insight/judgement, normal mood  . Impression:  She has been using oxygen throughout the night and today O2 sat 97% Fingerstick BS and most recent A1c in good range. Hypothyrodism well controlled.      May 20, 2024     in person     PCP:  Dr. Cy Rodriguez:  Dr. Vasu Fernandez          Neurology:  Dr. Ciro Jimenez  CC:   Diabetes    (11/9/21 [place] A1c 8.4 % ,  ,gd;.  TSH 0.4), Vit D 27.7)            8/2022 A1c 8.9  1/2024  7.7            (Hypothyroid)          (Parkinson's disease)  87 yo accompanied by her friend and caregiver, Sarah MandujanoElizabeth.   Patient has Parkinson's Disease for about 25 years and requires a wheelchair.  Medications include: Metformin 500 mg BID  repaglinide 2 mg TID AC glipizide ER 10 mg in PM  levothyroxine 75 mcg daily.    Today she brings in fingerstick blood sugars from before and after meals. Post prandial sugars rarely go over 180 mg/dl max.  : : Constitutional:  Alert, well nourished, healthy appearance, no acute distress  Eyes:  No proptosis, no stare Thyroid: Pulmonary:  No respiratory distress, no accessory muscle use; normal rate and effort Cardiac:  Normal rate Vascular:  Endocrine:  No stigmata of Cushings Syndrome Musculoskeletal:  Normal gait, no involuntary movements Neurology:  No tremors Affect/Mood/Psych:  Oriented x 3; normal affect, normal insight/judgement, normal mood  . Impression:   Diabetes appears to be under acceptable control on current Rx. Plan:   Updated labs today. Also see PCP. ROV by January. Jan 29, 2024                 needs new OneTouch Ultra  [facility]  [place]  PCP:  Dr. Cy Rodriguez:  Dr. Vasu Fernandez          Neurology:  Dr. Ciro Jimenez  CC:   Diabetes    (11/9/21 [place] A1c 8.4 % ,  ,gd;.  TSH 0.4), Vit D 27.7)            8/2022 A1c 8.9            (Hypothyroid)          (Parkinson's disease)  87 yo accompanied by her friend and caregiver, Sarah Naylor.   Patient has Parkinson's Disease for about 25 years and requires a wheelchair.  Medications include: Metformin 500 mg BID  repaglinide 2 mg TID AC glipizide ER 10 mg in PM  levothyroxine 75 mcg daily.    : : Constitutional:  Alert, well nourished, healthy appearance, no acute distress  Eyes:  No proptosis, no stare Thyroid: Pulmonary:  No respiratory distress, no accessory muscle use; normal rate and effort Cardiac:  Normal rate Vascular:  Endocrine:  No stigmata of Cushings Syndrome Musculoskeletal:  Normal gait, no involuntary movements Neurology:  No tremors Affect/Mood/Psych:  Oriented x 3; normal affect, normal insight/judgement, normal mood  . Impression:  Review of data show blood sugars under acceptable control. Plan:  same Rx. Needs new OneTouch Ultra ROV May  Sep 25, 2023   in person in wheelchair with Jorge    PCP:  Dr. Cy Rodriguez:  Dr. Vasu Fernandez          Neurology:  Dr. Ciro Jimenez  CC:   Diabetes    (11/9/21 [place] A1c 8.4 % ,  ,gd;.  TSH 0.4), Vit D 27.7)            8/2022 A1c 8.9            (Hypothyroid)          (Parkinson's disease)  86 yo accompanied by her friend and caregiver, Sarah Biswas and Luis Alfredo.   Patient has Parkinson's Disease for about 25 years and requires a wheelchair.  Medications include: Metformin 500 mg BID  repaglinide 2 mg TID AC glipizide ER 10 mg in PM  levothyroxine 75 mcg daily.    FBS are now running 136, 132 although after breakfast up to 237 (roll w/ butter)  : : Constitutional:  Alert, well nourished, healthy appearance, no acute distress  Eyes:  No proptosis, no stare Thyroid:  normal to palpation  Pulmonary:  No respiratory distress, no accessory muscle use; normal rate and effort Cardiac:  Normal rate Vascular:  Endocrine:  No stigmata of Cushings Syndrome Musculoskeletal:  Normal gait, no involuntary movements Neurology:  No tremors Affect/Mood/Psych:  Oriented x 3; normal affect, normal insight/judgement, normal mood  . Imp:    FBS are in good range with PM metformin and gllipizide ER. Spikes during day, despite the repaglinide, reflect relatively starchy meals Labs kindly provided by Dr. Ruby show A1c down to 8.2%  Impression: ACP guidelines for her age accept A1c up to 8%, so she is doing well.  Plan:  Same Rx for now and ROV in a few months.    Annual eye exam.     May 22, 2023    in person with Nu  PCP:  Dr. Cy Ruby   July 27, 2022           Pulm:  Dr. Vasu Fernandez          Neurology:  Dr. Ciro Jimenez  CC:   Diabetes    (11/9/21 [place] A1c 8.4 % ,  ,gd;.  TSH 0.4), Vit D 27.7)            8/2022 A1c 8.9            (Hypothyroid)          (Parkinson's disease)  86 yo accompanied by her friend and caregiver, Sarah Biswas and Luis Alfredo.   Patient has Parkinson's Disease for about 25 years and requires a wheelchair.  Medications include: Metformin 500 mg BID  repaglinide 2 mg TID AC  levothyroxine 75 mcg daily.     : : Constitutional:  Alert, well nourished, healthy appearance, no acute distress  Eyes:  No proptosis, no stare Thyroid: Pulmonary:  No respiratory distress, no accessory muscle use; normal rate and effort Cardiac:  Normal rate Vascular:  Endocrine:  No stigmata of Cushing's Syndrome Musculoskeletal:  Normal gait, no involuntary movements Neurology:  No tremors Affect/Mood/Psych:  Oriented x 3; normal affect, normal insight/judgement, normal mood  .  Imp:  Luis Alfredo reports FBS around 180. Plan:  Add glipizide ER 2.5 mg after evening meal to improve FBS. ROV by January.   Luis Alfredo will bring records to visit.      Dec 19, 2022     in person accompanied by Sarah Ruiz  (p )  PCP:  Dr. Cy Ruby   July 27, 2022           Pulm:  Dr. Vasu Fernandez          Neurology:  Dr. Ciro Jimenez  CC:   Diabetes    (11/9/21 [place] A1c 8.4 % ,  ,gd;.  TSH 0.4), Vit D 27.7)            8/2022 A1c 8.9            (Hypothyroid)          (Parkinson's disease)  86 yo accompanied by her friend and caregiver, Sarah Biswas and Luis Alfredo.   Patient has Parkinson's Disease for about 25 years and requires a wheelchair.  Medications include: Metformin 500 mg BID  re;aglinide 1 mg BID AC   levothyroxine 75 mcg daily.    . atrovastatin 10 mg lisinopril 30 mg amlodipine 5 mg carbidopa/levodopa lexapro albuterol Anoro Ellipta inhaler Exelone 1.5 mg BID    : : Constitutional:  Alert, well nourished, healthy appearance, no acute distress  Eyes:  No proptosis, no stare Thyroid: Pulmonary:  No respiratory distress, no accessory muscle use; normal rate and effort Cardiac:  Normal rate Vascular:  Endocrine:  No stigmata of Cushing's Syndrome Musculoskeletal:  Normal gait, no involuntary movements Neurology:  No tremors Affect/Mood/Psych:  Oriented x 3; normal affect, normal insight/judgement, normal mood  .  Impression:  FBS runs 150-170 mg;/dl, so will add glipizide ER 2.5 mg in PM if still high by next visit in May.   After meals is also routinely elevated, so will increase the repaglinide to 2 mg AC meals. ROV by May 2023.     Aug 22, 2022       in person with Luis Alfredo and Sarah Biswas     PCP:  Dr. Cy Ruby   July 27, 2022           Pulm:  Dr. Vasu Fernandez          Neurology:  Dr. Ciro Jimenez  CC:   Diabetes    (11/9/21 [place] A1c 8.4 % ,  ,gd;.  TSH 0.4), Vit D 27.7)          (Hypothyroid)          (Parkinson's disease)  87 yo accompanied by her friend and caregiver, Sarah Biswas and Luis Alfredo.   Patient has Parkinson's Disease for about 25 years and requires a wheelchair.  Medications include Metformin 500 mg BID and levothyroxine 75 mcg daily.      : : Constitutional:  Alert, well nourished, healthy appearance, no acute distress  Eyes:  No proptosis, no stare Thyroid: Pulmonary:  No respiratory distress, no accessory muscle use; normal rate and effort Cardiac:  Normal rate Vascular:  Endocrine:  No stigmata of Cushing's Syndrome Musculoskeletal:  Normal gait, no involuntary movements Neurology:  No tremors Affect/Mood/Psych:  Oriented x 3; normal affect, normal insight/judgement, normal mood  .  Impression:  Hypothyroidism well controlled. 4/18/22 TSH 0.74 on LT4 75 mcg daily A1c on 4/18/22 and  suggests room for improvement.  Plan:  Gave Luis Alfredo another Verio and she said she will assist patient in monitoring sugars at least once a day: AC breakfast and AC dinner - alternating. Add Prandin 1 mg BID AC (repaglinide)  ROV by December or January. All Rx to [facility] now.   A1c today    Apr 18, 2022      in person, accompanied by her friend and caregiver  PCP:  Dr. Cy Ruby         Pulm:  Dr. Vasu Fernandez          Neurology:  Dr. Ciro Jimenez  CC:   Diabetes    (11/9/21 [place] A1c 8.4 % ,  ,gd;.  TSH 0.4), Vit D 27.7)          (Hypothyroid)          (Partkinson's disease)  87 yo accompanied by her friend and caregiver, Sarah Zulay.   Patient has Parkinson's Disease for about 25 years and requires a wheelchair.  Since last visit, obtained a OneTouch Verio meter, but was having difficulty using it.  Verio shows BS today 222 m/gdl.   : : Constitutional:  Alert, well nourished, healthy appearance, no acute distress  Eyes:  No proptosis, no stare Thyroid: Pulmonary:  No respiratory distress, no accessory muscle use; normal rate and effort Cardiac:  Normal rate Vascular:  Endocrine:  No stigmata of Cushing's Syndrome Musculoskeletal:  Normal gait, no involuntary movements Neurology:  No tremors Affect/Mood/Psych:  Oriented x 3; normal affect, normal insight/judgement, normal mood  .  Impression: Diabetes under loose control.  Plan:  She will start testing three meals a week. [facility] A1c today.       Feb 23, 2022        in wheelchair accompanied by friend and caregiver   Sarah Biswas (rel to [facility])    [phone]                                    as well as her home health aide   PCP:  Dr. Cy Ruby         Pulm:  Dr. Vasu Fernandez          Neurology:  Dr. Ciro Jimenez  CC:   Diabetes    (11/9/21 [place] A1c 8.4 % ,  ,gd;.  TSH 0.4), Vit D 27.7)          (Hypothyroid)          (Partkinson's disease)  87 yo accompanied by her friend and caregiver, Sarah Mandujanozac.   Patient has Parkinson's Disease for about 25 years and requires a wheelchair.  She brings in a list of her medications which includes :  Metformin 500 mg BID Levothyroxine 75 mcg daily atrovastain 10 mg daily lisinopril 30 mg daily amlodipine 5 mg daily lexapro 10 mg daily albuterol anoro ellipta donepezil 5 mg in PM     A1c  8.4   Impression:  Blood sugar in mid 200's after breakfast.  Plan:  Reviewed ALFONSO vineselines and strategies to achieve these. Asked her aide to assist in  monitoring sugars for 3 meals a week. KARTIK in 2 and 4 months.  Thank you.  -duong

## 2024-09-24 LAB
ESTIMATED AVERAGE GLUCOSE: 183 MG/DL
HBA1C MFR BLD HPLC: 8 %

## 2024-12-06 ENCOUNTER — RX RENEWAL (OUTPATIENT)
Age: 89
End: 2024-12-06

## 2024-12-25 PROBLEM — F10.90 ALCOHOL USE: Status: INACTIVE | Noted: 2021-06-11

## 2025-02-10 ENCOUNTER — APPOINTMENT (OUTPATIENT)
Dept: ENDOCRINOLOGY | Facility: CLINIC | Age: 89
End: 2025-02-10

## 2025-02-24 ENCOUNTER — APPOINTMENT (OUTPATIENT)
Dept: PULMONOLOGY | Facility: CLINIC | Age: 89
End: 2025-02-24
Payer: MEDICARE

## 2025-02-24 VITALS
DIASTOLIC BLOOD PRESSURE: 70 MMHG | OXYGEN SATURATION: 97 % | WEIGHT: 190 LBS | BODY MASS INDEX: 32.44 KG/M2 | SYSTOLIC BLOOD PRESSURE: 130 MMHG | HEIGHT: 64 IN | HEART RATE: 85 BPM

## 2025-02-24 DIAGNOSIS — J44.9 CHRONIC OBSTRUCTIVE PULMONARY DISEASE, UNSPECIFIED: ICD-10-CM

## 2025-02-24 PROCEDURE — 99214 OFFICE O/P EST MOD 30 MIN: CPT

## 2025-02-25 ENCOUNTER — HOSPITAL ENCOUNTER (INPATIENT)
Dept: HOSPITAL 74 - JER | Age: 89
LOS: 6 days | DRG: 189 | End: 2025-03-03
Admitting: STUDENT IN AN ORGANIZED HEALTH CARE EDUCATION/TRAINING PROGRAM
Payer: COMMERCIAL

## 2025-02-25 VITALS — BODY MASS INDEX: 35.2 KG/M2

## 2025-02-25 DIAGNOSIS — N17.9: ICD-10-CM

## 2025-02-25 DIAGNOSIS — I10: ICD-10-CM

## 2025-02-25 DIAGNOSIS — F02.80: ICD-10-CM

## 2025-02-25 DIAGNOSIS — J96.02: Primary | ICD-10-CM

## 2025-02-25 DIAGNOSIS — G20.A1: ICD-10-CM

## 2025-02-25 DIAGNOSIS — J96.01: ICD-10-CM

## 2025-02-25 DIAGNOSIS — E11.9: ICD-10-CM

## 2025-02-25 DIAGNOSIS — E87.5: ICD-10-CM

## 2025-02-25 DIAGNOSIS — J18.9: ICD-10-CM

## 2025-02-25 LAB
ALBUMIN SERPL-MCNC: 3.2 G/DL (ref 3.4–5)
ALP SERPL-CCNC: 83 U/L (ref 45–117)
ALT SERPL-CCNC: 10 U/L (ref 13–61)
ANION GAP SERPL CALC-SCNC: 3 MMOL/L (ref 4–13)
AST SERPL-CCNC: 52 U/L (ref 15–37)
BILIRUB SERPL-MCNC: 0.4 MG/DL (ref 0.2–1)
BNP SERPL-MCNC: 816.2 PG/ML (ref 5–450)
BUN SERPL-MCNC: 34.7 MG/DL (ref 7–18)
CALCIUM SERPL-MCNC: 8.5 MG/DL (ref 8.5–10.1)
CHLORIDE SERPL-SCNC: 103 MMOL/L (ref 98–107)
CO2 SERPL-SCNC: 34 MMOL/L (ref 21–32)
CREAT SERPL-MCNC: 1.6 MG/DL (ref 0.55–1.3)
DEPRECATED RDW RBC AUTO: 17.6 % (ref 11.6–15.6)
GLUCOSE SERPL-MCNC: 203 MG/DL (ref 74–106)
HCT VFR BLD CALC: 35.1 % (ref 32.4–45.2)
HGB BLD-MCNC: 10.8 GM/DL (ref 10.7–15.3)
MCH RBC QN AUTO: 30.3 PG (ref 25.7–33.7)
MCHC RBC AUTO-ENTMCNC: 30.7 G/DL (ref 32–36)
MCV RBC: 98.7 FL (ref 80–96)
PLATELET # BLD AUTO: 187 10^3/UL (ref 134–434)
PMV BLD: 9.5 FL (ref 7.5–11.1)
POTASSIUM SERPLBLD-SCNC: 5.5 MMOL/L (ref 3.5–5.1)
PROT SERPL-MCNC: 6.1 G/DL (ref 6.4–8.2)
RBC # BLD AUTO: 3.55 M/MM3 (ref 3.6–5.2)
SODIUM SERPL-SCNC: 139 MMOL/L (ref 136–145)
WBC # BLD AUTO: 8 K/MM3 (ref 4–10)

## 2025-02-25 RX ADMIN — PIPERACILLIN AND TAZOBACTAM SCH MLS/HR: 4; .5 INJECTION, POWDER, LYOPHILIZED, FOR SOLUTION INTRAVENOUS at 22:36

## 2025-02-25 RX ADMIN — SODIUM CHLORIDE ONE ML: 9 INJECTION, SOLUTION INTRAVENOUS at 21:40

## 2025-02-26 LAB
ALBUMIN SERPL-MCNC: 3.2 G/DL (ref 3.4–5)
ALP SERPL-CCNC: 80 U/L (ref 45–117)
ALT SERPL-CCNC: 10 U/L (ref 13–61)
ANION GAP SERPL CALC-SCNC: 3 MMOL/L (ref 4–13)
ANISOCYTOSIS BLD QL: (no result)
APPEARANCE UR: (no result)
ARTERIAL PATENCY WRIST A: POSITIVE
ARTERIAL PATENCY WRIST A: POSITIVE
AST SERPL-CCNC: 26 U/L (ref 15–37)
BACTERIA # UR AUTO: 4 /UL (ref 0–1359)
BASE EXCESS BLDA CALC-SCNC: -2.7 MMOL/L (ref -2–2)
BASE EXCESS BLDA CALC-SCNC: 3.2 MMOL/L (ref -2–2)
BILIRUB SERPL-MCNC: 0.4 MG/DL (ref 0.2–1)
BILIRUB UR STRIP.AUTO-MCNC: NEGATIVE MG/DL
BREATHS.MECHANICAL ON VENT: 14
BUN SERPL-MCNC: 34.8 MG/DL (ref 7–18)
CALCIUM SERPL-MCNC: 8.6 MG/DL (ref 8.5–10.1)
CASTS URNS QL MICRO: 1 /UL (ref 0–3.1)
CHLORIDE SERPL-SCNC: 104 MMOL/L (ref 98–107)
CO2 SERPL-SCNC: 34 MMOL/L (ref 21–32)
COLOR UR: YELLOW
CREAT SERPL-MCNC: 1.7 MG/DL (ref 0.55–1.3)
DEPRECATED RDW RBC AUTO: 16.8 % (ref 11.6–15.6)
EPITH CASTS URNS QL MICRO: 15 /UL (ref 0–25.1)
GLUCOSE SERPL-MCNC: 119 MG/DL (ref 74–106)
HCT VFR BLD CALC: 33.8 % (ref 32.4–45.2)
HCT VFR BLDV CALC: 29 % (ref 32.4–45.2)
HCT VFR BLDV CALC: 33 % (ref 32.4–45.2)
HGB BLD-MCNC: 10.3 GM/DL (ref 10.7–15.3)
KETONES UR QL STRIP: (no result)
LEUKOCYTE ESTERASE UR QL STRIP.AUTO: NEGATIVE
MACROCYTES BLD QL: 0
MCH RBC QN AUTO: 30 PG (ref 25.7–33.7)
MCHC RBC AUTO-ENTMCNC: 30.5 G/DL (ref 32–36)
MCV RBC: 98.5 FL (ref 80–96)
NITRITE UR QL STRIP: NEGATIVE
PCO2 BLDA: 68.3 MMHG (ref 35–45)
PCO2 BLDA: 80.1 MMHG (ref 35–45)
PH UR: 5 [PH] (ref 5–8)
PLATELET # BLD AUTO: 182 10^3/UL (ref 134–434)
PMV BLD: 9.8 FL (ref 7.5–11.1)
PO2 BLDA: 76.2 MMHG (ref 80–100)
PO2 BLDA: 79.7 MMHG (ref 80–100)
POTASSIUM SERPLBLD-SCNC: 4.8 MMOL/L (ref 3.5–5.1)
PROT SERPL-MCNC: 6 G/DL (ref 6.4–8.2)
PROT UR QL STRIP: (no result)
PROT UR QL STRIP: NEGATIVE
RBC # BLD AUTO: 27.5 /UL (ref 0–23.9)
RBC # BLD AUTO: 3.43 M/MM3 (ref 3.6–5.2)
SAO2 % BLDA: 91.5 % (ref 95–98)
SAO2 % BLDA: 93.1 % (ref 95–98)
SODIUM SERPL-SCNC: 140 MMOL/L (ref 136–145)
SP GR UR: 1.02 (ref 1.01–1.03)
UROBILINOGEN UR STRIP-MCNC: 1 MG/DL (ref 0.2–1)
WBC # BLD AUTO: 8.1 K/MM3 (ref 4–10)
WBC # UR AUTO: 19 /UL (ref 0–25.8)

## 2025-02-26 RX ADMIN — HEPARIN SODIUM SCH UNIT: 5000 INJECTION, SOLUTION INTRAVENOUS; SUBCUTANEOUS at 23:21

## 2025-02-26 RX ADMIN — ESCITALOPRAM OXALATE SCH MG: 10 TABLET, FILM COATED ORAL at 09:22

## 2025-02-26 RX ADMIN — SODIUM CHLORIDE, POTASSIUM CHLORIDE, SODIUM LACTATE AND CALCIUM CHLORIDE SCH MLS/HR: 600; 310; 30; 20 INJECTION, SOLUTION INTRAVENOUS at 02:17

## 2025-02-26 RX ADMIN — CARBIDOPA AND LEVODOPA SCH EACH: 25; 250 TABLET ORAL at 09:22

## 2025-02-26 RX ADMIN — LEVOTHYROXINE SODIUM SCH MCG: 75 TABLET ORAL at 08:53

## 2025-02-26 RX ADMIN — BUDESONIDE AND FORMOTEROL FUMARATE DIHYDRATE SCH INH: 160; 4.5 AEROSOL RESPIRATORY (INHALATION) at 23:41

## 2025-02-26 RX ADMIN — TAZOBACTAM SODIUM AND PIPERACILLIN SODIUM SCH MLS/HR: 250; 2 INJECTION, SOLUTION INTRAVENOUS at 07:38

## 2025-02-26 RX ADMIN — PANTOPRAZOLE SODIUM SCH MG: 40 TABLET, DELAYED RELEASE ORAL at 09:22

## 2025-02-26 RX ADMIN — IPRATROPIUM BROMIDE AND ALBUTEROL SULFATE SCH AMP: .5; 3 SOLUTION RESPIRATORY (INHALATION) at 08:31

## 2025-02-26 RX ADMIN — FLUTICASONE FUROATE, UMECLIDINIUM BROMIDE AND VILANTEROL TRIFENATATE SCH: 100; 62.5; 25 POWDER RESPIRATORY (INHALATION) at 14:46

## 2025-02-26 RX ADMIN — MULTIVITAMIN TABLET SCH TAB: TABLET at 09:22

## 2025-02-26 RX ADMIN — METHYLPREDNISOLONE SODIUM SUCCINATE SCH MG: 40 INJECTION, POWDER, FOR SOLUTION INTRAMUSCULAR; INTRAVENOUS at 14:45

## 2025-02-26 RX ADMIN — INSULIN ASPART SCH: 100 INJECTION, SOLUTION INTRAVENOUS; SUBCUTANEOUS at 07:23

## 2025-02-26 RX ADMIN — OXYCODONE HYDROCHLORIDE AND ACETAMINOPHEN SCH MG: 500 TABLET ORAL at 09:22

## 2025-02-27 LAB
ANION GAP SERPL CALC-SCNC: 5 MMOL/L (ref 4–13)
BNP SERPL-MCNC: 1532.6 PG/ML (ref 5–450)
BUN SERPL-MCNC: 39 MG/DL (ref 7–18)
CALCIUM SERPL-MCNC: 8.7 MG/DL (ref 8.5–10.1)
CHLORIDE SERPL-SCNC: 101 MMOL/L (ref 98–107)
CO2 SERPL-SCNC: 32 MMOL/L (ref 21–32)
CREAT SERPL-MCNC: 1.8 MG/DL (ref 0.55–1.3)
GLUCOSE SERPL-MCNC: 172 MG/DL (ref 74–106)
MAGNESIUM SERPL-MCNC: 2.1 MG/DL (ref 1.8–2.4)
PHOSPHATE SERPL-MCNC: 5.7 MG/DL (ref 2.5–4.9)
POTASSIUM SERPLBLD-SCNC: 5.3 MMOL/L (ref 3.5–5.1)
SODIUM SERPL-SCNC: 138 MMOL/L (ref 136–145)

## 2025-02-27 RX ADMIN — FUROSEMIDE ONE MG: 10 INJECTION, SOLUTION INTRAVENOUS at 17:34

## 2025-02-28 LAB
ALBUMIN SERPL-MCNC: 2.9 G/DL (ref 3.4–5)
ALP SERPL-CCNC: 68 U/L (ref 45–117)
ALT SERPL-CCNC: 9 U/L (ref 13–61)
ANION GAP SERPL CALC-SCNC: 6 MMOL/L (ref 4–13)
ANISOCYTOSIS BLD QL: (no result)
AST SERPL-CCNC: 19 U/L (ref 15–37)
BILIRUB SERPL-MCNC: 0.4 MG/DL (ref 0.2–1)
BUN SERPL-MCNC: 44.9 MG/DL (ref 7–18)
CALCIUM SERPL-MCNC: 8.5 MG/DL (ref 8.5–10.1)
CHLORIDE SERPL-SCNC: 100 MMOL/L (ref 98–107)
CO2 SERPL-SCNC: 33 MMOL/L (ref 21–32)
CREAT SERPL-MCNC: 2 MG/DL (ref 0.55–1.3)
DEPRECATED RDW RBC AUTO: 16.7 % (ref 11.6–15.6)
GLUCOSE SERPL-MCNC: 93 MG/DL (ref 74–106)
HCT VFR BLD CALC: 30.6 % (ref 32.4–45.2)
HGB BLD-MCNC: 9.6 GM/DL (ref 10.7–15.3)
MACROCYTES BLD QL: (no result)
MCH RBC QN AUTO: 30.6 PG (ref 25.7–33.7)
MCHC RBC AUTO-ENTMCNC: 31.5 G/DL (ref 32–36)
MCV RBC: 97.2 FL (ref 80–96)
PLATELET # BLD AUTO: 165 10^3/UL (ref 134–434)
PMV BLD: 10.1 FL (ref 7.5–11.1)
POTASSIUM SERPLBLD-SCNC: 4.9 MMOL/L (ref 3.5–5.1)
PROT SERPL-MCNC: 5.4 G/DL (ref 6.4–8.2)
RBC # BLD AUTO: 3.15 M/MM3 (ref 3.6–5.2)
SODIUM SERPL-SCNC: 139 MMOL/L (ref 136–145)
WBC # BLD AUTO: 7.4 K/MM3 (ref 4–10)

## 2025-02-28 RX ADMIN — TAZOBACTAM SODIUM AND PIPERACILLIN SODIUM SCH MLS/HR: 375; 3 INJECTION, SOLUTION INTRAVENOUS at 02:48

## 2025-03-01 RX ADMIN — POTASSIUM CHLORIDE, DEXTROSE MONOHYDRATE AND SODIUM CHLORIDE SCH MLS/HR: 150; 5; 450 INJECTION, SOLUTION INTRAVENOUS at 13:20

## 2025-03-02 LAB
ALBUMIN SERPL-MCNC: 3.1 G/DL (ref 3.4–5)
ALP SERPL-CCNC: 72 U/L (ref 45–117)
ALT SERPL-CCNC: 10 U/L (ref 13–61)
ANION GAP SERPL CALC-SCNC: 5 MMOL/L (ref 4–13)
ANISOCYTOSIS BLD QL: 0
AST SERPL-CCNC: 17 U/L (ref 15–37)
BASO STIPL BLD QL SMEAR: 0
BILIRUB SERPL-MCNC: 0.5 MG/DL (ref 0.2–1)
BUN SERPL-MCNC: 58.6 MG/DL (ref 7–18)
CALCIUM SERPL-MCNC: 8.6 MG/DL (ref 8.5–10.1)
CHLORIDE SERPL-SCNC: 100 MMOL/L (ref 98–107)
CO2 SERPL-SCNC: 32 MMOL/L (ref 21–32)
CREAT SERPL-MCNC: 2.9 MG/DL (ref 0.55–1.3)
DACRYOCYTES BLD QL SMEAR: 0
DEPRECATED RDW RBC AUTO: 16.7 % (ref 11.6–15.6)
DOHLE BOD BLD QL SMEAR: 0
GLUCOSE SERPL-MCNC: 219 MG/DL (ref 74–106)
HCT VFR BLD CALC: 34.3 % (ref 32.4–45.2)
HELMET CELLS BLD QL SMEAR: 0
HGB BLD-MCNC: 10.9 GM/DL (ref 10.7–15.3)
HOWELL-JOLLY BOD BLD QL SMEAR: 0
MACROCYTES BLD QL: 0
MCH RBC QN AUTO: 31.6 PG (ref 25.7–33.7)
MCHC RBC AUTO-ENTMCNC: 31.7 G/DL (ref 32–36)
MCV RBC: 99.8 FL (ref 80–96)
OVALOCYTES BLD QL SMEAR: 0
PLATELET # BLD AUTO: 201 10^3/UL (ref 134–434)
PMV BLD: 10.4 FL (ref 7.5–11.1)
POTASSIUM SERPLBLD-SCNC: 5.9 MMOL/L (ref 3.5–5.1)
PROT SERPL-MCNC: 6 G/DL (ref 6.4–8.2)
RBC # BLD AUTO: 3.44 M/MM3 (ref 3.6–5.2)
ROULEAUX BLD QL SMEAR: 0
SICKLE CELLS BLD QL SMEAR: 0
SODIUM SERPL-SCNC: 138 MMOL/L (ref 136–145)
TARGETS BLD QL SMEAR: 0
TOXIC GRANULES BLD QL SMEAR: 0
WBC # BLD AUTO: 7.6 K/MM3 (ref 4–10)

## 2025-03-02 RX ADMIN — LEUCINE, PHENYLALANINE, LYSINE, METHIONINE, ISOLEUCINE, VALINE, HISTIDINE, THREONINE, TRYPTOPHAN, ALANINE, GLYCINE, ARGININE, PROLINE, SERINE, TYROSINE, DEXTROSE SCH MLS/HR: 311; 238; 247; 170; 255; 247; 204; 179; 77; 880; 438; 489; 289; 213; 17; 5 INJECTION INTRAVENOUS at 15:21

## 2025-03-02 RX ADMIN — OLIVE OIL AND SOYBEAN OIL SCH MLS/HR: 16; 4 INJECTION, EMULSION INTRAVENOUS at 22:27

## 2025-03-02 RX ADMIN — SODIUM ZIRCONIUM CYCLOSILICATE SCH: 10 POWDER, FOR SUSPENSION ORAL at 15:20

## 2025-03-02 RX ADMIN — SODIUM CHLORIDE STA: 9 INJECTION, SOLUTION INTRAVENOUS at 15:31

## 2025-03-03 VITALS
TEMPERATURE: 98.8 F | DIASTOLIC BLOOD PRESSURE: 59 MMHG | RESPIRATION RATE: 18 BRPM | SYSTOLIC BLOOD PRESSURE: 104 MMHG | HEART RATE: 88 BPM

## 2025-03-03 LAB
ALBUMIN SERPL-MCNC: 2.7 G/DL (ref 3.4–5)
ALP SERPL-CCNC: 59 U/L (ref 45–117)
ALT SERPL-CCNC: 13 U/L (ref 13–61)
ANION GAP SERPL CALC-SCNC: 6 MMOL/L (ref 4–13)
AST SERPL-CCNC: 17 U/L (ref 15–37)
BASOPHILS # BLD: 0.3 % (ref 0–2)
BILIRUB SERPL-MCNC: 0.6 MG/DL (ref 0.2–1)
BUN SERPL-MCNC: 83.1 MG/DL (ref 7–18)
CALCIUM SERPL-MCNC: 8.1 MG/DL (ref 8.5–10.1)
CHLORIDE SERPL-SCNC: 98 MMOL/L (ref 98–107)
CO2 SERPL-SCNC: 30 MMOL/L (ref 21–32)
CREAT SERPL-MCNC: 4.1 MG/DL (ref 0.55–1.3)
DEPRECATED RDW RBC AUTO: 16.4 % (ref 11.6–15.6)
EOSINOPHIL # BLD: 0.7 % (ref 0–4.5)
GLUCOSE SERPL-MCNC: 225 MG/DL (ref 74–106)
HCT VFR BLD CALC: 32 % (ref 32.4–45.2)
HGB BLD-MCNC: 10 GM/DL (ref 10.7–15.3)
LYMPHOCYTES # BLD: 4.1 % (ref 8–40)
MCH RBC QN AUTO: 31.1 PG (ref 25.7–33.7)
MCHC RBC AUTO-ENTMCNC: 31.1 G/DL (ref 32–36)
MCV RBC: 99.7 FL (ref 80–96)
MONOCYTES # BLD AUTO: 11.5 % (ref 3.8–10.2)
NEUTROPHILS # BLD: 83.4 % (ref 42.8–82.8)
PLATELET # BLD AUTO: 155 10^3/UL (ref 134–434)
PMV BLD: 10.1 FL (ref 7.5–11.1)
POTASSIUM SERPLBLD-SCNC: 5.5 MMOL/L (ref 3.5–5.1)
PROT SERPL-MCNC: 5.6 G/DL (ref 6.4–8.2)
RBC # BLD AUTO: 3.21 M/MM3 (ref 3.6–5.2)
SODIUM SERPL-SCNC: 134 MMOL/L (ref 136–145)
WBC # BLD AUTO: 11.2 K/MM3 (ref 4–10)

## 2025-03-03 RX ADMIN — Medication SCH: at 11:12

## 2025-03-03 RX ADMIN — Medication SCH MLS/HR: at 12:39

## 2025-03-24 ENCOUNTER — RX RENEWAL (OUTPATIENT)
Age: 89
End: 2025-03-24

## 2025-05-19 ENCOUNTER — APPOINTMENT (OUTPATIENT)
Dept: PULMONOLOGY | Facility: CLINIC | Age: 89
End: 2025-05-19

## 2025-08-11 ENCOUNTER — APPOINTMENT (OUTPATIENT)
Dept: ENDOCRINOLOGY | Facility: CLINIC | Age: 89
End: 2025-08-11